# Patient Record
Sex: FEMALE | Race: BLACK OR AFRICAN AMERICAN | NOT HISPANIC OR LATINO | Employment: FULL TIME | URBAN - METROPOLITAN AREA
[De-identification: names, ages, dates, MRNs, and addresses within clinical notes are randomized per-mention and may not be internally consistent; named-entity substitution may affect disease eponyms.]

---

## 2017-09-06 ENCOUNTER — OFFICE VISIT (OUTPATIENT)
Dept: OBSTETRICS AND GYNECOLOGY | Facility: CLINIC | Age: 32
End: 2017-09-06

## 2017-09-06 VITALS
HEART RATE: 63 BPM | HEIGHT: 66 IN | WEIGHT: 213 LBS | SYSTOLIC BLOOD PRESSURE: 142 MMHG | BODY MASS INDEX: 34.23 KG/M2 | DIASTOLIC BLOOD PRESSURE: 83 MMHG

## 2017-09-06 DIAGNOSIS — Z01.419 ENCOUNTER FOR GYNECOLOGICAL EXAMINATION WITHOUT ABNORMAL FINDING: Primary | ICD-10-CM

## 2017-09-06 DIAGNOSIS — L73.2 VULVAL HIDRADENITIS SUPPURATIVA: ICD-10-CM

## 2017-09-06 PROCEDURE — 99395 PREV VISIT EST AGE 18-39: CPT | Performed by: OBSTETRICS & GYNECOLOGY

## 2017-09-06 NOTE — PROGRESS NOTES
"GYN Annual Exam     CC- Here for annual exam.     Diana Xie is a 32 y.o. female who presents for annual well woman exam. Periods are regular every 28-30 days, lasting 4 days. Dysmenorrhea:mild, occurring first 1-2 days of flow. Cyclic symptoms include none. No intermenstrual bleeding, spotting, or discharge.  Pt has an abscess on her left inner thigh.     OB History      Para Term  AB TAB SAB Ectopic Multiple Living    3 3        3          Current contraception: tubal ligation  History of abnormal Pap smear: yes - LEEP in the past  Family history of uterine, colon or ovarian cancer: yes - question about this in maternal grandmother -ovarian   History of abnormal mammogram: no  Family history of breast cancer: no  Last Pap : records are not available.     History reviewed. No pertinent past medical history.    Past Surgical History:   Procedure Laterality Date   • CERVICAL BIOPSY  W/ LOOP ELECTRODE EXCISION     •  SECTION     • TUBAL ABDOMINAL LIGATION     • WISDOM TOOTH EXTRACTION         No current outpatient prescriptions on file.    No Known Allergies    Social History   Substance Use Topics   • Smoking status: Former Smoker     Types: Cigarettes   • Smokeless tobacco: Never Used   • Alcohol use Yes       Family History   Problem Relation Age of Onset   • Ovarian cancer Maternal Grandmother    • Breast cancer Neg Hx    • Uterine cancer Neg Hx    • Colon cancer Neg Hx    • Deep vein thrombosis Neg Hx    • Pulmonary embolism Neg Hx        Review of Systems   Constitutional: Negative for chills and fever.   Gastrointestinal: Negative for abdominal pain.   Genitourinary: Negative for dysuria, menstrual problem, pelvic pain, vaginal bleeding and vaginal discharge.   All other systems reviewed and are negative.      /83  Pulse 63  Ht 66\" (167.6 cm)  Wt 213 lb (96.6 kg)  LMP 2017 (Exact Date)  Breastfeeding? No  BMI 34.38 kg/m2    Physical Exam   Constitutional: She is " oriented to person, place, and time. She appears well-developed and well-nourished. No distress.   HENT:   Head: Normocephalic and atraumatic.   Eyes: Conjunctivae are normal.   Neck: Normal range of motion. Neck supple. No thyromegaly present.   Cardiovascular: Normal rate and regular rhythm.    No murmur heard.  Pulmonary/Chest: Effort normal and breath sounds normal.   Abdominal: Soft. Bowel sounds are normal. She exhibits no distension. There is no tenderness.   Genitourinary: Vagina normal. Pelvic exam was performed with patient supine. There is no lesion on the right labia. There is lesion (2 cm abscess are on left side of mons toward inguinal fold ) on the left labia. Uterus is enlarged (8 weeks size- anteverted). Uterus is not fixed and not tender. Cervix exhibits no motion tenderness. Right adnexum displays no mass and no tenderness. Left adnexum displays no mass and no tenderness. No bleeding in the vagina. No vaginal discharge found.   Musculoskeletal: She exhibits no edema.   Lymphadenopathy:        Right: No inguinal adenopathy present.        Left: No inguinal adenopathy present.   Neurological: She is alert and oriented to person, place, and time.   Skin: No rash noted.   Psychiatric: She has a normal mood and affect. Her behavior is normal.          Assessment     1) GYN annual well woman exam.   2) hidradenitis suppurativa  Trial of Dicloxacillin         Plan     1) Breast Health - Clinical breast exam yearly, Self breast awareness monthly  2) Pap - updated today   3) Smoking status- non-smoker   4) Seat belts recommended  5) Follow up prn and one year.     Hernandez Monteiro MD   9/6/2017  1:21 PM

## 2017-09-07 ENCOUNTER — TELEPHONE (OUTPATIENT)
Dept: OBSTETRICS AND GYNECOLOGY | Facility: CLINIC | Age: 32
End: 2017-09-07

## 2017-09-07 RX ORDER — FLUCONAZOLE 150 MG/1
150 TABLET ORAL ONCE
Qty: 1 TABLET | Refills: 2 | Status: SHIPPED | OUTPATIENT
Start: 2017-09-07 | End: 2017-09-07

## 2017-09-07 NOTE — TELEPHONE ENCOUNTER
Gabrielle,     Medication sent to the pharmacy.     Thanks   Dr. Monteiro    ----- Message from Estefani Lacy sent at 9/7/2017  2:28 PM EDT -----  Contact: PT  Pt called to request rx for Diflucan.    Pt # [phone]

## 2017-09-09 LAB
CYTOLOGIST CVX/VAG CYTO: NORMAL
CYTOLOGY CVX/VAG DOC THIN PREP: NORMAL
DX ICD CODE: NORMAL
HIV 1 & 2 AB SER-IMP: NORMAL
HPV I/H RISK 1 DNA CVX QL PROBE+SIG AMP: NEGATIVE
Lab: NORMAL
OTHER STN SPEC: NORMAL
PATH REPORT.FINAL DX SPEC: NORMAL
STAT OF ADQ CVX/VAG CYTO-IMP: NORMAL

## 2017-10-12 ENCOUNTER — TELEPHONE (OUTPATIENT)
Dept: OBSTETRICS AND GYNECOLOGY | Facility: CLINIC | Age: 32
End: 2017-10-12

## 2017-10-12 RX ORDER — FLUCONAZOLE 150 MG/1
150 TABLET ORAL ONCE
Qty: 1 TABLET | Refills: 2 | Status: SHIPPED | OUTPATIENT
Start: 2017-10-12 | End: 2017-10-12

## 2017-10-12 RX ORDER — METRONIDAZOLE 500 MG/1
500 TABLET ORAL 2 TIMES DAILY
Qty: 14 TABLET | Refills: 4 | Status: SHIPPED | OUTPATIENT
Start: 2017-10-12 | End: 2017-10-19

## 2017-10-12 NOTE — TELEPHONE ENCOUNTER
Gabrielle    I sent in more medication. If she has trouble telling the difference, she needs to come in for a visit.     Thanks   Dr. Monteiro    ----- Message from Estefani Lacy sent at 10/12/2017  1:19 PM EDT -----  Contact: Pt  Pt called to report vaginal irritation, pressure and strong urge to urinate but cant always go.  Has had symptoms for 1 week.  Pt states was rx'd abx at annual on 9/6/17, and has also taken 2 doses of yeast medication.  Please advise.    Pt # [phone]

## 2017-10-24 ENCOUNTER — OFFICE VISIT (OUTPATIENT)
Dept: OBSTETRICS AND GYNECOLOGY | Facility: CLINIC | Age: 32
End: 2017-10-24

## 2017-10-24 VITALS
SYSTOLIC BLOOD PRESSURE: 113 MMHG | HEART RATE: 62 BPM | WEIGHT: 202 LBS | DIASTOLIC BLOOD PRESSURE: 71 MMHG | BODY MASS INDEX: 32.47 KG/M2 | HEIGHT: 66 IN

## 2017-10-24 DIAGNOSIS — N64.52 BLOODY DISCHARGE FROM RIGHT NIPPLE: ICD-10-CM

## 2017-10-24 DIAGNOSIS — N64.3 GALACTORRHEA: ICD-10-CM

## 2017-10-24 DIAGNOSIS — N64.4 BREAST PAIN, RIGHT: Primary | ICD-10-CM

## 2017-10-24 PROCEDURE — 99214 OFFICE O/P EST MOD 30 MIN: CPT | Performed by: OBSTETRICS & GYNECOLOGY

## 2017-10-24 NOTE — PROGRESS NOTES
"Subjective   Diana Xie is a 32 y.o. female c/o bloody discharge from the R breast. Pt noticed swelling on that breast last month 4 days prior to period.   LMP was 10/20/2017, started with d/c this am.     History of Present Illness     32 y.o. - One month ago with breast and areolar swelling   Right breast, right before cycle. Improved after cycle started. Next month with swelling and pain again, but now with bloody nipple discharge for about one week.  New to her. No family history of breast cancer. Pain is constant, dull ache.     The following portions of the patient's history were reviewed and updated as appropriate: allergies, current medications, past family history, past medical history, past social history, past surgical history and problem list.    Review of Systems   Constitutional: Negative for chills, fever and unexpected weight change.   Respiratory: Negative for chest tightness and shortness of breath.    Cardiovascular: Positive for chest pain.   Skin: Negative for color change, pallor, rash and wound.       Objective    /71  Pulse 62  Ht 66\" (167.6 cm)  Wt 202 lb (91.6 kg)  LMP 10/22/2017  BMI 32.6 kg/m2  Physical Exam   Constitutional: She appears well-developed and well-nourished. No distress.   Cardiovascular: Normal rate, regular rhythm and normal heart sounds.    No murmur heard.  Pulmonary/Chest: Effort normal and breath sounds normal. Right breast exhibits nipple discharge (Milky - possibly bloody discharge) and tenderness (Some tenderness in right areola). Right breast exhibits no inverted nipple, no mass and no skin change. Left breast exhibits no inverted nipple, no mass, no nipple discharge, no skin change and no tenderness.         Assessment/Plan   Problems Addressed this Visit     None      Visit Diagnoses     Breast pain, right    -  Primary    Relevant Orders    Mammo Diagnostic Bilateral With CAD    US breast right complete    Ambulatory Referral to Breast Surgery    " Galactorrhea        Relevant Orders    TSH Rfx On Abnormal To Free T4    Prolactin    Mammo Diagnostic Bilateral With CAD    US breast right complete    Ambulatory Referral to Breast Surgery    Bloody discharge from right nipple        Relevant Orders    Mammo Diagnostic Bilateral With CAD    US breast right complete    Ambulatory Referral to Breast Surgery        Not sure if this is significant or irritation.   Check TSH, PRL  Check MMG and ultrasound  Have surgeon consider if need ductogram or other testing of nipple discharge.     Discussed work up and follow up with patient.     Hernandez Monteiro MD  10/24/2017  3:22 PM

## 2017-10-25 ENCOUNTER — TELEPHONE (OUTPATIENT)
Dept: OBSTETRICS AND GYNECOLOGY | Facility: CLINIC | Age: 32
End: 2017-10-25

## 2017-10-25 LAB
PROLACTIN SERPL-MCNC: 4.1 NG/ML (ref 4.8–23.3)
TSH SERPL DL<=0.005 MIU/L-ACNC: 0.37 MIU/ML (ref 0.27–4.2)

## 2017-10-25 NOTE — TELEPHONE ENCOUNTER
----- Message from Hernandez Monteiro MD sent at 10/25/2017  9:41 AM EDT -----  Gabrielle, her labs are normal as far as the nipple discharge. Now waiting on mammogram, ultrasound and surgical consult. Thanks Dr. Monteiro

## 2017-11-06 ENCOUNTER — TELEPHONE (OUTPATIENT)
Dept: OBSTETRICS AND GYNECOLOGY | Facility: CLINIC | Age: 32
End: 2017-11-06

## 2017-11-06 NOTE — TELEPHONE ENCOUNTER
Amparo from Dr. Luis office called to inform us that the phone number in the PT chart has been disconnected and she was a no show for her appts at [facility] so they would not be able to see her if she keeps her appt for tomorrow with their office. If the PT calls back, please obtain a working phone number.

## 2017-11-07 ENCOUNTER — TELEPHONE (OUTPATIENT)
Dept: MAMMOGRAPHY | Facility: CLINIC | Age: 32
End: 2017-11-07

## 2017-11-07 NOTE — TELEPHONE ENCOUNTER
Tried to call patient to reschedule appointment since she missed her breast imaging appointment with [facility] yesterday. Phone number on file is invalid. Contacted Dr. Monteiro's office to let them know.     Amparo Jones RN

## 2017-11-07 NOTE — TELEPHONE ENCOUNTER
Called Dr. Monteiro's office to let them know the patient did not come to her appointment today. At this time we are going to close her file, but will reschedule if the patient contacts us.     Amparo Jones RN

## 2017-11-21 ENCOUNTER — TELEPHONE (OUTPATIENT)
Dept: OBSTETRICS AND GYNECOLOGY | Facility: CLINIC | Age: 32
End: 2017-11-21

## 2017-11-21 NOTE — TELEPHONE ENCOUNTER
----- Message from Hernandez Monteiro MD sent at 11/17/2017  2:52 PM EST -----  Sukumar    Mammogram and ultrasound  Is she going to set up and schedule?    Thanks   Dr. Monteiro      ----- Message -----     From: SYSTEM     Sent: 10/29/2017  12:08 AM       To: Hernandez Monteiro MD, #

## 2017-12-20 ENCOUNTER — TELEPHONE (OUTPATIENT)
Dept: OBSTETRICS AND GYNECOLOGY | Facility: CLINIC | Age: 32
End: 2017-12-20

## 2017-12-20 NOTE — TELEPHONE ENCOUNTER
No showed appointments for bloody nipple discharge. Please send letter to explain needs to keep re-schedule and follow up on testing.     Thanks   Dr. Monteiro

## 2018-01-16 ENCOUNTER — TELEPHONE (OUTPATIENT)
Dept: MAMMOGRAPHY | Facility: CLINIC | Age: 33
End: 2018-01-16

## 2018-01-16 ENCOUNTER — APPOINTMENT (OUTPATIENT)
Dept: ULTRASOUND IMAGING | Facility: HOSPITAL | Age: 33
End: 2018-01-16

## 2018-01-16 ENCOUNTER — HOSPITAL ENCOUNTER (EMERGENCY)
Facility: HOSPITAL | Age: 33
Discharge: HOME | End: 2018-01-16
Attending: EMERGENCY MEDICINE | Admitting: EMERGENCY MEDICINE

## 2018-01-16 ENCOUNTER — DOCUMENTATION (OUTPATIENT)
Dept: MAMMOGRAPHY | Facility: CLINIC | Age: 33
End: 2018-01-16

## 2018-01-16 VITALS
HEIGHT: 67 IN | RESPIRATION RATE: 16 BRPM | HEART RATE: 69 BPM | BODY MASS INDEX: 32.18 KG/M2 | DIASTOLIC BLOOD PRESSURE: 68 MMHG | WEIGHT: 205 LBS | SYSTOLIC BLOOD PRESSURE: 121 MMHG | TEMPERATURE: 97.9 F | OXYGEN SATURATION: 100 %

## 2018-01-16 DIAGNOSIS — N61.1 BREAST ABSCESS OF FEMALE: Primary | ICD-10-CM

## 2018-01-16 LAB
ANION GAP SERPL CALCULATED.3IONS-SCNC: 9.9 MMOL/L
BASOPHILS # BLD AUTO: 0.02 10*3/MM3 (ref 0–0.2)
BASOPHILS NFR BLD AUTO: 0.2 % (ref 0–1.5)
BUN BLD-MCNC: 7 MG/DL (ref 6–20)
BUN/CREAT SERPL: 10.6 (ref 7–25)
CALCIUM SPEC-SCNC: 8.6 MG/DL (ref 8.6–10.5)
CHLORIDE SERPL-SCNC: 101 MMOL/L (ref 98–107)
CO2 SERPL-SCNC: 29.1 MMOL/L (ref 22–29)
CREAT BLD-MCNC: 0.66 MG/DL (ref 0.57–1)
DEPRECATED RDW RBC AUTO: 51.1 FL (ref 37–54)
EOSINOPHIL # BLD AUTO: 0.14 10*3/MM3 (ref 0–0.7)
EOSINOPHIL NFR BLD AUTO: 1.2 % (ref 0.3–6.2)
ERYTHROCYTE [DISTWIDTH] IN BLOOD BY AUTOMATED COUNT: 15.4 % (ref 11.7–13)
GFR SERPL CREATININE-BSD FRML MDRD: 126 ML/MIN/1.73
GLUCOSE BLD-MCNC: 87 MG/DL (ref 65–99)
HCG SERPL QL: NEGATIVE
HCT VFR BLD AUTO: 39.1 % (ref 35.6–45.5)
HGB BLD-MCNC: 12.5 G/DL (ref 11.9–15.5)
IMM GRANULOCYTES # BLD: 0.02 10*3/MM3 (ref 0–0.03)
IMM GRANULOCYTES NFR BLD: 0.2 % (ref 0–0.5)
LYMPHOCYTES # BLD AUTO: 3.84 10*3/MM3 (ref 0.9–4.8)
LYMPHOCYTES NFR BLD AUTO: 32.8 % (ref 19.6–45.3)
MCH RBC QN AUTO: 28.9 PG (ref 26.9–32)
MCHC RBC AUTO-ENTMCNC: 32 G/DL (ref 32.4–36.3)
MCV RBC AUTO: 90.5 FL (ref 80.5–98.2)
MONOCYTES # BLD AUTO: 0.65 10*3/MM3 (ref 0.2–1.2)
MONOCYTES NFR BLD AUTO: 5.5 % (ref 5–12)
NEUTROPHILS # BLD AUTO: 7.05 10*3/MM3 (ref 1.9–8.1)
NEUTROPHILS NFR BLD AUTO: 60.1 % (ref 42.7–76)
PLATELET # BLD AUTO: 319 10*3/MM3 (ref 140–500)
PMV BLD AUTO: 10.3 FL (ref 6–12)
POTASSIUM BLD-SCNC: 3.5 MMOL/L (ref 3.5–5.2)
RBC # BLD AUTO: 4.32 10*6/MM3 (ref 3.9–5.2)
SODIUM BLD-SCNC: 140 MMOL/L (ref 136–145)
WBC NRBC COR # BLD: 11.72 10*3/MM3 (ref 4.5–10.7)

## 2018-01-16 PROCEDURE — 99244 OFF/OP CNSLTJ NEW/EST MOD 40: CPT | Performed by: SURGERY

## 2018-01-16 PROCEDURE — 76642 ULTRASOUND BREAST LIMITED: CPT

## 2018-01-16 PROCEDURE — 80048 BASIC METABOLIC PNL TOTAL CA: CPT | Performed by: EMERGENCY MEDICINE

## 2018-01-16 PROCEDURE — 99283 EMERGENCY DEPT VISIT LOW MDM: CPT

## 2018-01-16 PROCEDURE — 85025 COMPLETE CBC W/AUTO DIFF WBC: CPT | Performed by: EMERGENCY MEDICINE

## 2018-01-16 PROCEDURE — 84703 CHORIONIC GONADOTROPIN ASSAY: CPT | Performed by: EMERGENCY MEDICINE

## 2018-01-16 RX ORDER — DOXYCYCLINE 100 MG/1
100 CAPSULE ORAL EVERY 12 HOURS SCHEDULED
Qty: 20 CAPSULE | Refills: 0 | Status: SHIPPED | OUTPATIENT
Start: 2018-01-16 | End: 2018-09-05

## 2018-01-16 NOTE — ED TRIAGE NOTES
"Pt reports pain on right breast for 2 months.  Pt states \"I had some discharge from my nipple about 2 months ago.\" \" The spot on my right breast is about the size of a doughnut hole.\" \"I went to my Gyno who told me to follow up with a specialist but my insurance will not cover the specialist with out a referral.\" \"I now have a spot underneath my right armpit.\" \"The last two days the pain in my right breast is increasing.\" PT denies fevers, chills  "

## 2018-01-16 NOTE — TELEPHONE ENCOUNTER
Received message that pt needed to make an appt. Pt originally referred to office back in 10/2017 and pt no showed scheduled appt. Pts number was confirmed with Karlee and was given to me to call pt back to schedule.   I tried calling pt to schedule however both home and mother's number are non working.   If pt calls back. Pt has passport insurance and she needs to be advised that a referral from her PCP needs to be obtained before an appt can be made

## 2018-01-16 NOTE — CONSULTS
Date of consultation  1-    History of present illness-the patient is a 33-year-old black female who states that 2 months ago around the beginning of her menstrual cycle she developed some right breast discomfort.  After her menstrual cycle the pain subsided but the next month she again developed pain and she noted some milky discharge from the nipple as well as some blood.  This discharge was spontaneous and just came from the right breast.  There was no associated trauma.  She also felt a lump beneath the areola at this time and she went to see her OB/GYN.  A prolactin level was drawn and it was normal.  Mammograms and ultrasound were ordered but due to some problems getting into her primary care doctor, these studies have not been performed.  The pain became worse and she felt a lump under the arm prompted her to come to the emergency room today.  An ultrasound was performed and it shows changes consistent with an abscess.    Past history  Medical illnesses-the patient denies any diabetes mellitus hypertension heart lung or kidney disease.  She did have gestational diabetes during her last pregnancy  Prior [id number] ×3  Meds on admission-none  Allergies-none    Review of systems-the review of systems is negative except for some pain and a lump in the right axilla.  The patient has also lost 100 pounds in the last 2 years after she has given up sugar.    Social history-the patient smokes 2-3 cigarettes per day and has an occasional drink area    Physical examination  Vital signs-blood pressure 167/83, pulse 89, which are 97.9  Gen.-alert, oriented, black female in no acute distress  HEENT-normocephalic, sclera are nonicteric, extraocular movements are intact  Heart-regular rate and rhythm without murmur  Lungs-clear to auscultation  Right breast-there is some redness around the edge of the areola which is fairly faint.  It covers an area from 3:00 to 7:00.  There is a mass beneath the areola in  the 5 o'clock position that is quite tender and rather firm.  The rest of the breast is free of any palpable abnormalities in the nipple areolar complex is not distorted in anyway.  Left breast-there is no skin dimpling or nipple retraction, I do not palpate any masses in the breast  Lymphatics-there is no obvious axillary adenopathy.  The area that she was feeling is more involving the skin of the axilla above the hairline.  Abdomen-soft and nontender without masses or hepatosplenomegaly.  She does have.  Extremities-full range of motion of all extremities    Impression-right breast abscess.  The etiology of this is not clear.  The patient is not breast-feeding and his had no trauma to the breast.  This is a fairly chronic problem and I think it would be best treated by going to the operating room for formal incision and drainage.    Plan-the patient has eaten and therefore we will see about scheduling this tomorrow.  I have discussed with the patient the details of the procedure and how we might consider leaving the wound open to be packed or put a small drain in it.

## 2018-01-16 NOTE — ED PROVIDER NOTES
" EMERGENCY DEPARTMENT ENCOUNTER    CHIEF COMPLAINT  Chief Complaint: breast pain  History given by: patient  History limited by: nothing  Room Number: 47/47  PMD: Kavya Mcnally      HPI:  Pt is a 33 y.o. female who presents complaining of intermittent right breast tenderness for the last two months. Pt states that she had one episode of yellow, bloody nipple discharge at the beginning of her symptoms but that she has not had any discharge since that time. Pt states that her right breast has become large and increasingly painful over the last two days. Pt states that she noticed a \"lump\" in her R axilla as well. Pt states that she was scheduled for a mammogram and to see a breast surgeon but was unable to do so due to insurance issues.    Duration:  2 months  Onset: gradual  Timing: intermitten  Location: R breast  Radiation: none  Quality: tenderness  Intensity/Severity: moderate  Progression: worsening  Associated Symptoms: nipple discharge, \"lump\" in R axilla  Aggravating Factors: none  Alleviating Factors: none  Previous Episodes: none mentioned   Treatment before arrival: none    PAST MEDICAL HISTORY  Active Ambulatory Problems     Diagnosis Date Noted   • No Active Ambulatory Problems     Resolved Ambulatory Problems     Diagnosis Date Noted   • No Resolved Ambulatory Problems     No Additional Past Medical History       PAST SURGICAL HISTORY  Past Surgical History:   Procedure Laterality Date   • CERVICAL BIOPSY  W/ LOOP ELECTRODE EXCISION     •  SECTION     • TUBAL ABDOMINAL LIGATION     • WISDOM TOOTH EXTRACTION         FAMILY HISTORY  Family History   Problem Relation Age of Onset   • Ovarian cancer Maternal Grandmother    • Breast cancer Neg Hx    • Uterine cancer Neg Hx    • Colon cancer Neg Hx    • Deep vein thrombosis Neg Hx    • Pulmonary embolism Neg Hx        SOCIAL HISTORY  Social History     Social History   • Marital status: Single     Spouse name: N/A   • Number of children: N/A   • Years " of education: N/A     Occupational History   • Not on file.     Social History Main Topics   • Smoking status: Former Smoker     Types: Cigarettes   • Smokeless tobacco: Never Used   • Alcohol use Yes   • Drug use: No   • Sexual activity: Yes     Partners: Male     Birth control/ protection: Surgical     Other Topics Concern   • Not on file     Social History Narrative       ALLERGIES  Review of patient's allergies indicates no known allergies.    REVIEW OF SYSTEMS  Review of Systems   Constitutional: Negative for chills and fever.   HENT: Negative for sore throat.    Respiratory: Negative for cough.    Gastrointestinal: Negative for nausea and vomiting.   Genitourinary: Negative for dysuria.   Musculoskeletal: Negative for back pain.        Right breast tenderness, nipple discharage   Hematological: Positive for adenopathy (R axilla).   Psychiatric/Behavioral: The patient is not nervous/anxious.        PHYSICAL EXAM  ED Triage Vitals   Temp Heart Rate Resp BP SpO2   01/16/18 1543 01/16/18 1542 01/16/18 1542 01/16/18 1542 01/16/18 1542   97.9 °F (36.6 °C) 89 16 167/83 100 %      Temp src Heart Rate Source Patient Position BP Location FiO2 (%)   01/16/18 1543 01/16/18 1542 01/16/18 1542 01/16/18 1542 --   Oral Monitor Sitting Right arm        Physical Exam   Constitutional: She is oriented to person, place, and time and well-developed, well-nourished, and in no distress.   Eyes: EOM are normal.   Neck: Normal range of motion.   Cardiovascular: Normal rate and regular rhythm.    Pulmonary/Chest: Effort normal and breath sounds normal. No respiratory distress. Right breast exhibits tenderness. Right breast exhibits no nipple discharge.   Female chaperone present for breast exam. 3x3cm tender, indurated area medial to the right nipple   Lymphadenopathy:     She has axillary adenopathy (right).   Neurological: She is alert and oriented to person, place, and time. She has normal sensation and normal strength.   Skin: Skin  is warm and dry.   Psychiatric: Affect normal.   Nursing note and vitals reviewed.    RADIOLOGY  US Breast Right Limited   Final Result         Reviewed pt's US R Breast, which shows a right retroareolar fluid collection with irregular margins, through enhancement and internal irregular echogenicity, which in this clinical setting is virtually diagnostic of abscess measuring up to 1.7 x 1.3 x 1.7 cm. Independently viewed by me. Interpreted by radiologist. Discussed with US technician.    I ordered the above noted radiological studies. Interpreted by radiologist. Reviewed by me in PACS.       PROCEDURES  Procedures      PROGRESS AND CONSULTS  ED Course     1637- Ordered US of right breast for further evaluation and placed call to Dr. Luis (breast surgery) for consult.    1652- Discussed the pt's case with Dr. Luis (breast surgery) who states that he will come evaluate the pt in the ED.    1658- Rechecked pt. Pt is on her way to US. Notified pt of the consult with Dr. Luis (breast surgery) and that he will come evaluate the pt in the ED.    1716- Dr. Luis (breast surgery) is in the ED for consult.    1744- Discussed the pt's case with Dr. Luis (breast surgery) who requests that I start the pt on doxycycline 100mg BID and that he will see the pt tomorrow to remove the pt's abscess. He requested that I order pre-operative blood work that he will follow up on tomorrow.    1746- Ordered blood work for further evaluation.    1752- Rechecked pt. Pt is resting comfortably. Notified pt of the consult with Dr. Luis and the plan to discharge the pt home on abx with instructions to follow up with Dr. Luis. Pt agrees with the plan and all questions.    MEDICAL DECISION MAKING  Results were reviewed/discussed with the patient and they were also made aware of online access. Pt also made aware that some labs, such as cultures, will not be resulted during ER visit and follow up with PMD is necessary.      MDM  Number of Diagnoses or Management Options  Breast abscess of female:      Amount and/or Complexity of Data Reviewed  Clinical lab tests: ordered (Dr. Luis will follow up on labs drawn today)  Tests in the radiology section of CPT®: ordered and reviewed (R Breast US shows a right retroareolar fluid collection with irregular margins, through enhancement and internal irregular echogenicity, which in this clinical setting is virtually diagnostic of abscess measuring up to 1.7 x 1.3 x 1.7 cm.)  Discussion of test results with the performing providers: yes (US technician)  Discuss the patient with other providers: yes (D/w Dr. Luis (breast surgery))  Independent visualization of images, tracings, or specimens: yes    Patient Progress  Patient progress: stable         DIAGNOSIS  Final diagnoses:   Breast abscess of female       DISPOSITION  DISCHARGE    Patient discharged in stable condition.    Reviewed implications of results, diagnosis, meds, responsibility to follow up, warning signs and symptoms of possible worsening, potential complications and reasons to return to ER, including fever, worsening pain or any concerns.    Patient/Family voiced understanding of above instructions.    Discussed plan for discharge, as there is no emergent indication for admission.  Pt/family is agreeable and understands need for follow up and repeat testing.  Pt is aware that discharge does not mean that nothing is wrong but it indicates no emergency is present that requires admission and they must continue care with follow-up as given below or physician of their choice.     FOLLOW-UP  Dylan Luis MD  [address] [place] [address] [id number] [phone]    Schedule an appointment as soon as possible for a visit           Medication List      New Prescriptions          doxycycline 100 MG capsule   Commonly known as:  MONODOX   Take 1 capsule by mouth Every 12 (Twelve) Hours.               Latest  Documented Vital Signs:  As of 5:54 PM  BP- 167/83 HR- 89 Temp- 97.9 °F (36.6 °C) (Oral) O2 sat- 100%    --  Documentation assistance provided by bebo Navarro for Dr. Dior.  Information recorded by the scribe was done at my direction and has been verified and validated by me.     Ne Navarro  01/16/18 [id number]       Jace Dior MD  01/16/18 [id number]

## 2018-01-16 NOTE — CONSULTS
Date of consultation   1-16/2018    History of present illness-the patient is a 33-year-old black female who states that she has been having problems in her right breast for about 2 months.  The problem first began right round her menstrual cycle several months ago.  She detected some pain in the breast beneath the areola.  This pain subsided after she completed her menstrual cycle.  The next month she again developed the pain and this time it was associated with some milky discharge and then some blood was noted.  She can also feel more of a mass at that time that has not gone away.  She saw her OB/GYN late December for these problems.  A prolactin level was drawn and it was normal.  She also had mammograms and an ultrasound ordered but due to some issues getting into her primary care doctor those had not been completed.  The pain became rather bad today and she also noticed a lump under her arm which concerned her.  These things prompted her to come to the emergency room today.  An ultrasound was obtained today and it reveals changes most consistent with an abscess.  Past history    Medical illnesses-the patient denies any hypertension, heart lung or kidney disease.  She did have some gestational diabetes.    Surgeries-the patient has had 3 C-sections  Meds-none  Allergies-none    Review of systems  Aside from a 100 pound weight loss over the last several years because of dietary changes her review of systems is negative.    Social history-the patient smokes 2-3 cigarettes per day and just has an occasional alcoholic drink    Physical examination  Vital signs-temperature 97.9, blood pressure 167/83, pulse 89  HEENT-normocephalic, extraocular movements are intact  Heart-regular rate and rhythm without murmur  Lungs-clear to auscultation  Right breast-there is some faint redness at the edge of the areola from 3:00 to 7:00.  The nipple areolar complex is not distorted in anyway.  There is a mass palpable beneath the  areola in the 5 o'clock position which is quite tender to the patient.  Left breast-there is no evidence of skin dimpling or nipple retraction.  I do not palpate any concerning masses in the breast.  Lymphatics-I do not palpate any concerning lymph nodes in the axilla.  The area that she was feeling is actually up on the undersurface of the arm and measures about 1.5 x 1 cm.  It is tender to touch but the overlying skin is not red and the area is not fluctuant.  Abdomen-soft and nontender without masses or hepatosplenomegaly.  She does have  scars    Impression-chronic right breast abscess.  The etiology is not entirely clear.  The patient is not breast-feeding and denies any trauma to the breast.    Plan-the patient has eaten today and this does not appear to be an emergency.  I will schedule her to have incision and drainage of this abscess in the operating room.

## 2018-01-17 ENCOUNTER — PREP FOR SURGERY (OUTPATIENT)
Dept: OTHER | Facility: HOSPITAL | Age: 33
End: 2018-01-17

## 2018-01-17 ENCOUNTER — HOSPITAL ENCOUNTER (OUTPATIENT)
Facility: HOSPITAL | Age: 33
Setting detail: HOSPITAL OUTPATIENT SURGERY
Discharge: HOME | End: 2018-01-17
Attending: SURGERY | Admitting: SURGERY

## 2018-01-17 ENCOUNTER — ANESTHESIA EVENT (OUTPATIENT)
Dept: PERIOP | Facility: HOSPITAL | Age: 33
End: 2018-01-17

## 2018-01-17 ENCOUNTER — ANESTHESIA (OUTPATIENT)
Dept: PERIOP | Facility: HOSPITAL | Age: 33
End: 2018-01-17

## 2018-01-17 ENCOUNTER — TELEPHONE (OUTPATIENT)
Dept: MAMMOGRAPHY | Facility: CLINIC | Age: 33
End: 2018-01-17

## 2018-01-17 VITALS
SYSTOLIC BLOOD PRESSURE: 119 MMHG | BODY MASS INDEX: 31.93 KG/M2 | WEIGHT: 203.44 LBS | HEART RATE: 68 BPM | OXYGEN SATURATION: 99 % | DIASTOLIC BLOOD PRESSURE: 78 MMHG | HEIGHT: 67 IN | RESPIRATION RATE: 16 BRPM | TEMPERATURE: 98 F

## 2018-01-17 DIAGNOSIS — N61.1 ABSCESS OF RIGHT BREAST: ICD-10-CM

## 2018-01-17 DIAGNOSIS — N61.1 BREAST ABSCESS: ICD-10-CM

## 2018-01-17 DIAGNOSIS — N61.1 BREAST ABSCESS: Primary | ICD-10-CM

## 2018-01-17 LAB
B-HCG UR QL: NEGATIVE
INTERNAL NEGATIVE CONTROL: NEGATIVE
INTERNAL POSITIVE CONTROL: POSITIVE
Lab: NORMAL

## 2018-01-17 PROCEDURE — 87205 SMEAR GRAM STAIN: CPT | Performed by: SURGERY

## 2018-01-17 PROCEDURE — 25010000002 FENTANYL CITRATE (PF) 100 MCG/2ML SOLUTION: Performed by: ANESTHESIOLOGY

## 2018-01-17 PROCEDURE — 87070 CULTURE OTHR SPECIMN AEROBIC: CPT | Performed by: SURGERY

## 2018-01-17 PROCEDURE — 25010000002 MIDAZOLAM PER 1 MG: Performed by: ANESTHESIOLOGY

## 2018-01-17 PROCEDURE — 19020 MASTOTOMY EXPL DRG ABSC DP: CPT | Performed by: SURGERY

## 2018-01-17 PROCEDURE — 25010000002 ONDANSETRON PER 1 MG: Performed by: ANESTHESIOLOGY

## 2018-01-17 PROCEDURE — 25010000002 PROPOFOL 10 MG/ML EMULSION: Performed by: ANESTHESIOLOGY

## 2018-01-17 PROCEDURE — 87075 CULTR BACTERIA EXCEPT BLOOD: CPT | Performed by: SURGERY

## 2018-01-17 PROCEDURE — 88305 TISSUE EXAM BY PATHOLOGIST: CPT | Performed by: SURGERY

## 2018-01-17 RX ORDER — EPHEDRINE SULFATE 50 MG/ML
5 INJECTION, SOLUTION INTRAVENOUS ONCE AS NEEDED
Status: DISCONTINUED | OUTPATIENT
Start: 2018-01-17 | End: 2018-01-17 | Stop reason: HOSPADM

## 2018-01-17 RX ORDER — SODIUM CHLORIDE, SODIUM LACTATE, POTASSIUM CHLORIDE, CALCIUM CHLORIDE 600; 310; 30; 20 MG/100ML; MG/100ML; MG/100ML; MG/100ML
9 INJECTION, SOLUTION INTRAVENOUS CONTINUOUS
Status: DISCONTINUED | OUTPATIENT
Start: 2018-01-17 | End: 2018-01-17 | Stop reason: HOSPADM

## 2018-01-17 RX ORDER — MAGNESIUM HYDROXIDE 1200 MG/15ML
LIQUID ORAL AS NEEDED
Status: DISCONTINUED | OUTPATIENT
Start: 2018-01-17 | End: 2018-01-17 | Stop reason: HOSPADM

## 2018-01-17 RX ORDER — HYDROCODONE BITARTRATE AND ACETAMINOPHEN 5; 325 MG/1; MG/1
1-2 TABLET ORAL EVERY 4 HOURS PRN
Qty: 20 TABLET | Refills: 0 | Status: SHIPPED | OUTPATIENT
Start: 2018-01-17 | End: 2018-09-05

## 2018-01-17 RX ORDER — BUPIVACAINE HYDROCHLORIDE 2.5 MG/ML
INJECTION, SOLUTION EPIDURAL; INFILTRATION; INTRACAUDAL AS NEEDED
Status: DISCONTINUED | OUTPATIENT
Start: 2018-01-17 | End: 2018-01-17 | Stop reason: HOSPADM

## 2018-01-17 RX ORDER — NALOXONE HCL 0.4 MG/ML
0.4 VIAL (ML) INJECTION AS NEEDED
Status: DISCONTINUED | OUTPATIENT
Start: 2018-01-17 | End: 2018-01-17 | Stop reason: HOSPADM

## 2018-01-17 RX ORDER — LIDOCAINE HYDROCHLORIDE 10 MG/ML
0.5 INJECTION, SOLUTION EPIDURAL; INFILTRATION; INTRACAUDAL; PERINEURAL ONCE AS NEEDED
Status: DISCONTINUED | OUTPATIENT
Start: 2018-01-17 | End: 2018-01-17 | Stop reason: HOSPADM

## 2018-01-17 RX ORDER — PROPOFOL 10 MG/ML
VIAL (ML) INTRAVENOUS AS NEEDED
Status: DISCONTINUED | OUTPATIENT
Start: 2018-01-17 | End: 2018-01-17 | Stop reason: SURG

## 2018-01-17 RX ORDER — MORPHINE SULFATE 2 MG/ML
2 INJECTION, SOLUTION INTRAMUSCULAR; INTRAVENOUS
Status: DISCONTINUED | OUTPATIENT
Start: 2018-01-17 | End: 2018-01-17 | Stop reason: HOSPADM

## 2018-01-17 RX ORDER — MIDAZOLAM HYDROCHLORIDE 1 MG/ML
1 INJECTION INTRAMUSCULAR; INTRAVENOUS
Status: DISCONTINUED | OUTPATIENT
Start: 2018-01-17 | End: 2018-01-17 | Stop reason: HOSPADM

## 2018-01-17 RX ORDER — MIDAZOLAM HYDROCHLORIDE 1 MG/ML
2 INJECTION INTRAMUSCULAR; INTRAVENOUS
Status: DISCONTINUED | OUTPATIENT
Start: 2018-01-17 | End: 2018-01-17 | Stop reason: HOSPADM

## 2018-01-17 RX ORDER — ONDANSETRON 2 MG/ML
INJECTION INTRAMUSCULAR; INTRAVENOUS AS NEEDED
Status: DISCONTINUED | OUTPATIENT
Start: 2018-01-17 | End: 2018-01-17 | Stop reason: SURG

## 2018-01-17 RX ORDER — SODIUM CHLORIDE, SODIUM LACTATE, POTASSIUM CHLORIDE, CALCIUM CHLORIDE 600; 310; 30; 20 MG/100ML; MG/100ML; MG/100ML; MG/100ML
100 INJECTION, SOLUTION INTRAVENOUS CONTINUOUS
Status: DISCONTINUED | OUTPATIENT
Start: 2018-01-17 | End: 2018-01-17 | Stop reason: HOSPADM

## 2018-01-17 RX ORDER — FAMOTIDINE 10 MG/ML
20 INJECTION, SOLUTION INTRAVENOUS ONCE
Status: COMPLETED | OUTPATIENT
Start: 2018-01-17 | End: 2018-01-17

## 2018-01-17 RX ORDER — HYDROCODONE BITARTRATE AND ACETAMINOPHEN 5; 325 MG/1; MG/1
1 TABLET ORAL ONCE AS NEEDED
Status: DISCONTINUED | OUTPATIENT
Start: 2018-01-17 | End: 2018-01-17 | Stop reason: HOSPADM

## 2018-01-17 RX ORDER — ONDANSETRON 2 MG/ML
4 INJECTION INTRAMUSCULAR; INTRAVENOUS ONCE AS NEEDED
Status: COMPLETED | OUTPATIENT
Start: 2018-01-17 | End: 2018-01-17

## 2018-01-17 RX ORDER — FENTANYL CITRATE 50 UG/ML
25 INJECTION, SOLUTION INTRAMUSCULAR; INTRAVENOUS
Status: DISCONTINUED | OUTPATIENT
Start: 2018-01-17 | End: 2018-01-17 | Stop reason: HOSPADM

## 2018-01-17 RX ORDER — LABETALOL HYDROCHLORIDE 5 MG/ML
5 INJECTION, SOLUTION INTRAVENOUS
Status: DISCONTINUED | OUTPATIENT
Start: 2018-01-17 | End: 2018-01-17 | Stop reason: HOSPADM

## 2018-01-17 RX ORDER — IBUPROFEN 800 MG/1
800 TABLET ORAL EVERY 6 HOURS PRN
COMMUNITY
End: 2018-09-05

## 2018-01-17 RX ORDER — FENTANYL CITRATE 50 UG/ML
50 INJECTION, SOLUTION INTRAMUSCULAR; INTRAVENOUS
Status: DISCONTINUED | OUTPATIENT
Start: 2018-01-17 | End: 2018-01-17 | Stop reason: HOSPADM

## 2018-01-17 RX ORDER — SODIUM CHLORIDE 0.9 % (FLUSH) 0.9 %
1-10 SYRINGE (ML) INJECTION AS NEEDED
Status: DISCONTINUED | OUTPATIENT
Start: 2018-01-17 | End: 2018-01-17 | Stop reason: HOSPADM

## 2018-01-17 RX ADMIN — FENTANYL CITRATE 100 MCG: 50 INJECTION INTRAMUSCULAR; INTRAVENOUS at 16:00

## 2018-01-17 RX ADMIN — FENTANYL CITRATE 25 MCG: 50 INJECTION, SOLUTION INTRAMUSCULAR; INTRAVENOUS at 17:34

## 2018-01-17 RX ADMIN — SODIUM CHLORIDE, POTASSIUM CHLORIDE, SODIUM LACTATE AND CALCIUM CHLORIDE 9 ML/HR: 600; 310; 30; 20 INJECTION, SOLUTION INTRAVENOUS at 14:56

## 2018-01-17 RX ADMIN — PROPOFOL 400 MG: 10 INJECTION, EMULSION INTRAVENOUS at 16:15

## 2018-01-17 RX ADMIN — Medication 1 MG: at 14:57

## 2018-01-17 RX ADMIN — ONDANSETRON 4 MG: 2 INJECTION INTRAMUSCULAR; INTRAVENOUS at 17:47

## 2018-01-17 RX ADMIN — HYDROCODONE BITARTRATE AND ACETAMINOPHEN 1 TABLET: 5; 325 TABLET ORAL at 18:03

## 2018-01-17 RX ADMIN — FAMOTIDINE 20 MG: 10 INJECTION, SOLUTION INTRAVENOUS at 14:57

## 2018-01-17 RX ADMIN — SODIUM CHLORIDE, POTASSIUM CHLORIDE, SODIUM LACTATE AND CALCIUM CHLORIDE 9 ML/HR: 600; 310; 30; 20 INJECTION, SOLUTION INTRAVENOUS at 17:50

## 2018-01-17 RX ADMIN — SODIUM CHLORIDE, POTASSIUM CHLORIDE, SODIUM LACTATE AND CALCIUM CHLORIDE: 600; 310; 30; 20 INJECTION, SOLUTION INTRAVENOUS at 16:15

## 2018-01-17 RX ADMIN — FENTANYL CITRATE 25 MCG: 50 INJECTION, SOLUTION INTRAMUSCULAR; INTRAVENOUS at 17:16

## 2018-01-17 RX ADMIN — ONDANSETRON 4 MG: 2 INJECTION INTRAMUSCULAR; INTRAVENOUS at 16:30

## 2018-01-17 NOTE — OP NOTE
Operative note    Preoperative Diagnosis: Breast abscess, right    Postoperative Diagnosis: Same    Procedure Performed:right breast  incision and drainage of abscess with biopsy of abscess  wall    Dictating physician and surgeon: Dylan Luis MD      EBL: Minimal    FINDINGS AND DESCRIPTION OF PROCEDURE:     The patient was taken to the operating room and given adequate general anesthesia.  The right breast was prepped and draped in sterile fashion.  We made a circumareolar incision in the lower inner aspect of the areola.  Dissection was carried behind the areola until we encountered the abscess cavity.  The purulent material was cultured and then suctioned out.  Digital manipulation was used to break down any loculations that were present.  We did biopsy a portion of the wall and sent it to pathology.          The wound was irrigated and hemostasis obtained using electrocautery unit.  A quarter-inch Penrose drain was placed in the abscess cavity and brought out through the corner of our incision.  We then secured it at the skin level with 4-0 nylon sutures.  We also loosely closed the remainder of the incision with 4-0 nylon sutures.  A dry dressing was applied and the patient was awakened and taken to the recovery area in stable condition.  Sponge and needle counts were correct and there were no complications.    Sponge and needle counts were correct and the patient was taken to the recovery area in stable condition.

## 2018-01-17 NOTE — ANESTHESIA PROCEDURE NOTES
Airway  Urgency: elective    Airway not difficult    General Information and Staff    Patient location during procedure: OR  Anesthesiologist: LAKHWINDER MAGDALENO    Indications and Patient Condition    Preoxygenated: yes  Mask difficulty assessment: 1 - vent by mask    Final Airway Details  Final airway type: supraglottic airway      Successful airway: classic  Size 4    Number of attempts at approach: 2    Additional Comments  Bit down occluding LMA, desat recovered quickly, after anectine

## 2018-01-17 NOTE — PLAN OF CARE
Problem: Patient Care Overview (Adult)  Goal: Plan of Care Review  Outcome: Ongoing (interventions implemented as appropriate)   01/17/18 1447   Coping/Psychosocial Response Interventions   Plan Of Care Reviewed With patient     Goal: Adult Individualization and Mutuality  Outcome: Ongoing (interventions implemented as appropriate)   01/17/18 1447   Individualization   Patient Specific Preferences PT GOES BY WANDA     Goal: Discharge Needs Assessment  Outcome: Ongoing (interventions implemented as appropriate)      Problem: Perioperative Period (Adult)  Goal: Signs and Symptoms of Listed Potential Problems Will be Absent or Manageable (Perioperative Period)  Outcome: Ongoing (interventions implemented as appropriate)   01/17/18 1447   Perioperative Period   Problems Assessed (Perioperative Period) pain   Problems Present (Perioperative Period) pain

## 2018-01-17 NOTE — H&P (VIEW-ONLY)
Date of consultation  1-    History of present illness-the patient is a 33-year-old black female who states that 2 months ago around the beginning of her menstrual cycle she developed some right breast discomfort.  After her menstrual cycle the pain subsided but the next month she again developed pain and she noted some milky discharge from the nipple as well as some blood.  This discharge was spontaneous and just came from the right breast.  There was no associated trauma.  She also felt a lump beneath the areola at this time and she went to see her OB/GYN.  A prolactin level was drawn and it was normal.  Mammograms and ultrasound were ordered but due to some problems getting into her primary care doctor, these studies have not been performed.  The pain became worse and she felt a lump under the arm prompted her to come to the emergency room today.  An ultrasound was performed and it shows changes consistent with an abscess.    Past history  Medical illnesses-the patient denies any diabetes mellitus hypertension heart lung or kidney disease.  She did have gestational diabetes during her last pregnancy  Prior [id number] ×3  Meds on admission-none  Allergies-none    Review of systems-the review of systems is negative except for some pain and a lump in the right axilla.  The patient has also lost 100 pounds in the last 2 years after she has given up sugar.    Social history-the patient smokes 2-3 cigarettes per day and has an occasional drink area    Physical examination  Vital signs-blood pressure 167/83, pulse 89, which are 97.9  Gen.-alert, oriented, black female in no acute distress  HEENT-normocephalic, sclera are nonicteric, extraocular movements are intact  Heart-regular rate and rhythm without murmur  Lungs-clear to auscultation  Right breast-there is some redness around the edge of the areola which is fairly faint.  It covers an area from 3:00 to 7:00.  There is a mass beneath the areola in  the 5 o'clock position that is quite tender and rather firm.  The rest of the breast is free of any palpable abnormalities in the nipple areolar complex is not distorted in anyway.  Left breast-there is no skin dimpling or nipple retraction, I do not palpate any masses in the breast  Lymphatics-there is no obvious axillary adenopathy.  The area that she was feeling is more involving the skin of the axilla above the hairline.  Abdomen-soft and nontender without masses or hepatosplenomegaly.  She does have.  Extremities-full range of motion of all extremities    Impression-right breast abscess.  The etiology of this is not clear.  The patient is not breast-feeding and his had no trauma to the breast.  This is a fairly chronic problem and I think it would be best treated by going to the operating room for formal incision and drainage.    Plan-the patient has eaten and therefore we will see about scheduling this tomorrow.  I have discussed with the patient the details of the procedure and how we might consider leaving the wound open to be packed or put a small drain in it.

## 2018-01-17 NOTE — ANESTHESIA POSTPROCEDURE EVALUATION
"Patient: Diana Xie    Procedure Summary     Date Anesthesia Start Anesthesia Stop Room / Location    01/17/18 1602 1701  [facility] / [facility]       Procedure Diagnosis Surgeon Provider    RT. BREAST ABSCESS INCISION AND DRAINAGE (Right Breast) No diagnosis on file. MD Ying De Leon MD          Anesthesia Type: general  Last vitals  BP   118/74 (01/17/18 1814)   Temp   36.7 °C (98 °F) (01/17/18 1800)   Pulse   72 (01/17/18 1814)   Resp   16 (01/17/18 1814)     SpO2   99 % (01/17/18 1814)     Post Anesthesia Care and Evaluation    Patient location during evaluation: PACU  Patient participation: complete - patient participated  Level of consciousness: awake and alert  Pain management: adequate  Airway patency: patent  Anesthetic complications: No anesthetic complications    Cardiovascular status: acceptable  Respiratory status: acceptable  Hydration status: acceptable    Comments: /74 (BP Location: Right arm, Patient Position: Sitting)  Pulse 72  Temp 36.7 °C (98 °F) (Oral)   Resp 16  Ht 170.2 cm (67.01\")  Wt 92.3 kg (203 lb 7 oz)  [facility] 01/05/2018 Comment: NEGATIVE URINE HCG  SpO2 99%  BMI 31.86 kg/m2      "

## 2018-01-17 NOTE — PLAN OF CARE
Problem: Patient Care Overview (Adult)  Goal: Plan of Care Review  Outcome: Outcome(s) achieved Date Met: 01/17/18    Goal: Adult Individualization and Mutuality  Outcome: Outcome(s) achieved Date Met: 01/17/18    Goal: Discharge Needs Assessment  Outcome: Outcome(s) achieved Date Met: 01/17/18      Problem: Perioperative Period (Adult)  Goal: Signs and Symptoms of Listed Potential Problems Will be Absent or Manageable (Perioperative Period)  Outcome: Outcome(s) achieved Date Met: 01/17/18

## 2018-01-17 NOTE — TELEPHONE ENCOUNTER
Patient was seen in the ER 1/16/18 by Dr. Luis for a breast abscess. Dr. Luis was able to place patient on the OR schedule for 1/17/18 for an I&D of her breast, however when patient was called the numbers listed stated not working and the current employer number stated patient did not work there.    Patient did call 30 minutes later to see if we were doing surgery, I did confirm all phone numbers with the patient however she did update her work number, which is not entered. She confirmed that her phone number is correct as well as the number listed for her mother. Pt was informed again status of phone when called.    Dr. Luis to determine if patient will have surgery today or will schedule for another date.      UPDATE: 11:35 AM  Patient is confirmed for an I & D today in the main OR to arrive approx 2 pm for a approx 4 pm surgery.    Patient has been notified and understands prep and where to be at the hospital.    Dr. Luis has been notified and is putting in her surgery orders.

## 2018-01-19 LAB
CYTO UR: NORMAL
LAB AP CASE REPORT: NORMAL
Lab: NORMAL
PATH REPORT.FINAL DX SPEC: NORMAL
PATH REPORT.GROSS SPEC: NORMAL

## 2018-01-20 LAB
BACTERIA SPEC AEROBE CULT: NORMAL
GRAM STN SPEC: NORMAL
GRAM STN SPEC: NORMAL

## 2018-01-22 LAB — BACTERIA SPEC ANAEROBE CULT: NORMAL

## 2018-01-30 ENCOUNTER — OFFICE VISIT (OUTPATIENT)
Dept: MAMMOGRAPHY | Facility: CLINIC | Age: 33
End: 2018-01-30

## 2018-01-30 VITALS
TEMPERATURE: 98.3 F | OXYGEN SATURATION: 99 % | RESPIRATION RATE: 16 BRPM | DIASTOLIC BLOOD PRESSURE: 62 MMHG | SYSTOLIC BLOOD PRESSURE: 100 MMHG | HEART RATE: 76 BPM

## 2018-01-30 DIAGNOSIS — N61.1 BREAST ABSCESS: Primary | ICD-10-CM

## 2018-01-30 PROCEDURE — 99024 POSTOP FOLLOW-UP VISIT: CPT | Performed by: SURGERY

## 2018-01-30 NOTE — PROGRESS NOTES
Chief Complaint: Diana Xie is a  33 y.o. female, initially referred by No ref. provider found , who is here today for a postoperative visit.    History of Present Illness:  In the interim,Diana Xie has had the following procedure and resultant pathology report: She is undergone incision and drainage of a right breast abscess with placement of a Penrose drain.  We did biopsy the wall of the abscess cavity and it showed acute and chronic inflammation but no atypical or malignant cells.  The cultures have not really grown anything out.  The patient is currently not on antibiotics.    She has noted no redness, warmth or swelling at the incision site. Denies fever or chills.      Current Outpatient Prescriptions:   •  doxycycline (MONODOX) 100 MG capsule, Take 1 capsule by mouth Every 12 (Twelve) Hours., Disp: 20 capsule, Rfl: 0  •  HYDROcodone-acetaminophen (NORCO) 5-325 MG per tablet, Take 1-2 tablets by mouth Every 4 (Four) Hours As Needed (Pain)., Disp: 20 tablet, Rfl: 0  •  ibuprofen (ADVIL,MOTRIN) 800 MG tablet, Take 800 mg by mouth Every 6 (Six) Hours As Needed for Mild Pain ., Disp: , Rfl:   Physical examination  Right breast-there is a nicely healing incision along the areolar edge with a Penrose drain in place.  I was able to remove all the stitches today and applied some Steri-Strips.  The drain was also removed without difficulty.    Assessment:  Right breast abscess status post I&D-the patient is healing nicely and the drain has been removed along with the stitches.    Plan:  I will see her on an as-needed basis.  She has been instructed to apply a dry dressing to the incision until it closes over which probably will take about 5 days.  She should begin mammography at 41.          EMR Dragon/transcription disclaimer:    Much of this encounter note is an electronic transcription/translocation of spoken language to printed text.  The electronic translation of spoken language may permit erroneous, or at  times, nonsensical words or phrases to be inadvertently transcribed.  Although I have reviewed the note from such areas, some may still exist.

## 2018-09-05 ENCOUNTER — OFFICE VISIT (OUTPATIENT)
Dept: OBSTETRICS AND GYNECOLOGY | Facility: CLINIC | Age: 33
End: 2018-09-05

## 2018-09-05 VITALS
HEIGHT: 67 IN | SYSTOLIC BLOOD PRESSURE: 126 MMHG | DIASTOLIC BLOOD PRESSURE: 76 MMHG | HEART RATE: 85 BPM | BODY MASS INDEX: 32.96 KG/M2 | WEIGHT: 210 LBS

## 2018-09-05 DIAGNOSIS — N89.8 VAGINAL DISCHARGE: Primary | ICD-10-CM

## 2018-09-05 PROCEDURE — 99213 OFFICE O/P EST LOW 20 MIN: CPT | Performed by: OBSTETRICS & GYNECOLOGY

## 2018-09-05 RX ORDER — FLUCONAZOLE 150 MG/1
150 TABLET ORAL ONCE
Qty: 1 TABLET | Refills: 2 | Status: SHIPPED | OUTPATIENT
Start: 2018-09-05 | End: 2018-09-10 | Stop reason: SDUPTHER

## 2018-09-05 NOTE — PROGRESS NOTES
"Subjective   Diana Xie is a 33 y.o. female with increased clear vaginal d/c with some irritation.    History of Present Illness     33 y.o.    - 2 week history of clear/increased discharge.   No odor  Irritation present   No treatment       Review of Systems   Constitutional: Negative for chills and fever.   Gastrointestinal: Negative for abdominal pain.   Genitourinary: Positive for vaginal discharge. Negative for frequency and vaginal bleeding.       Objective    /76   Pulse 85   Ht 170.2 cm (67\")   Wt 95.3 kg (210 lb)   LMP 2018 (Exact Date)   Breastfeeding? No   BMI 32.89 kg/m²   Physical Exam   Constitutional: She appears well-developed and well-nourished. No distress.   HENT:   Head: Normocephalic and atraumatic.   Abdominal: Soft. Bowel sounds are normal. She exhibits no distension. There is no tenderness.   Genitourinary: Pelvic exam was performed with patient supine. There is no lesion or Bartholin's cyst on the right labia. There is no lesion or Bartholin's cyst on the left labia. Uterus is anteverted. Uterus is not deviated, enlarged, fixed or exhibiting a mass.   Cervix is not parous. Right adnexum displays no mass, no tenderness and no fullness. Left adnexum displays no mass, no tenderness and no fullness. No bleeding in the vagina. Vaginal discharge (White thin discharge ) found.   Musculoskeletal: She exhibits no edema.   Lymphadenopathy:        Right: No inguinal adenopathy present.        Left: No inguinal adenopathy present.   Skin: Skin is warm and dry.   Psychiatric: She has a normal mood and affect. Her behavior is normal.         Assessment/Plan   Problems Addressed this Visit     None      Visit Diagnoses     Vaginal discharge    -  Primary    Relevant Orders    NuSwab VG+ - Swab, Vagina        - Check NuSwab  - Treat empirically for yeast  -Otherwise treat per results.     Expectations reviewed.     Hernandez Monteiro MD  2018  11:22 AM             "

## 2018-09-08 LAB
A VAGINAE DNA VAG QL NAA+PROBE: ABNORMAL SCORE
BVAB2 DNA VAG QL NAA+PROBE: ABNORMAL SCORE
C ALBICANS DNA VAG QL NAA+PROBE: NEGATIVE
C GLABRATA DNA VAG QL NAA+PROBE: NEGATIVE
C TRACH RRNA SPEC QL NAA+PROBE: NEGATIVE
MEGA1 DNA VAG QL NAA+PROBE: ABNORMAL SCORE
N GONORRHOEA RRNA SPEC QL NAA+PROBE: NEGATIVE
T VAGINALIS RRNA SPEC QL NAA+PROBE: POSITIVE

## 2018-09-10 ENCOUNTER — TELEPHONE (OUTPATIENT)
Dept: OBSTETRICS AND GYNECOLOGY | Facility: CLINIC | Age: 33
End: 2018-09-10

## 2018-09-10 RX ORDER — FLUCONAZOLE 150 MG/1
150 TABLET ORAL ONCE
Qty: 1 TABLET | Refills: 2 | Status: SHIPPED | OUTPATIENT
Start: 2018-09-10 | End: 2018-09-10

## 2018-09-10 RX ORDER — METRONIDAZOLE 500 MG/1
2000 TABLET ORAL ONCE
Qty: 4 TABLET | Refills: 1 | Status: SHIPPED | OUTPATIENT
Start: 2018-09-10 | End: 2018-09-10

## 2018-09-10 NOTE — TELEPHONE ENCOUNTER
----- Message from Hernandez Monteiro MD sent at 9/10/2018 12:27 PM EDT -----  Gabrielle +for trichomoniasis- Aware. Going to have annual and DENI in a few weeks. Thanks Dr. Monteiro

## 2019-04-15 ENCOUNTER — OFFICE VISIT (OUTPATIENT)
Dept: OBSTETRICS AND GYNECOLOGY | Facility: CLINIC | Age: 34
End: 2019-04-15

## 2019-04-15 VITALS
HEIGHT: 67 IN | SYSTOLIC BLOOD PRESSURE: 111 MMHG | HEART RATE: 80 BPM | DIASTOLIC BLOOD PRESSURE: 73 MMHG | WEIGHT: 218 LBS | BODY MASS INDEX: 34.21 KG/M2

## 2019-04-15 DIAGNOSIS — Z01.419 ENCOUNTER FOR GYNECOLOGICAL EXAMINATION WITHOUT ABNORMAL FINDING: Primary | ICD-10-CM

## 2019-04-15 DIAGNOSIS — Z72.0 TOBACCO USE: ICD-10-CM

## 2019-04-15 PROCEDURE — 99395 PREV VISIT EST AGE 18-39: CPT | Performed by: OBSTETRICS & GYNECOLOGY

## 2019-04-15 PROCEDURE — 99406 BEHAV CHNG SMOKING 3-10 MIN: CPT | Performed by: OBSTETRICS & GYNECOLOGY

## 2019-04-15 NOTE — PROGRESS NOTES
GYN Annual Exam     CC- Here for annual exam.     Diana Xie is a 34 y.o. female who presents for annual well woman exam. Periods are regular every 28-30 days, lasting 3 days. Dysmenorrhea:mild, occurring first 1-2 days of flow. Cyclic symptoms include none. No intermenstrual bleeding, spotting, or discharge.    OB History      Para Term  AB Living    3 3 1     3    SAB TAB Ectopic Molar Multiple Live Births                         Current contraception: tubal ligation  History of abnormal Pap smear: no  Family history of uterine, colon or ovarian cancer: yes - ovarian - grandmother   History of abnormal mammogram: no  Family history of breast cancer: no  Last Pap : 18 - NIL HPV negative     Past Medical History:   Diagnosis Date   • Staph infection     AFTER C SECTION        Past Surgical History:   Procedure Laterality Date   • BREAST ABSCESS INCISION AND DRAINAGE Right 2018    Procedure: RT. BREAST ABSCESS INCISION AND DRAINAGE;  Surgeon: Dylan Luis MD;  Location: [facility];  Service:    •  SECTION     • TUBAL ABDOMINAL LIGATION     • WISDOM TOOTH EXTRACTION         No current outpatient medications on file.    No Known Allergies    Social History     Tobacco Use   • Smoking status: Current Every Day Smoker     Types: Cigarettes   • Smokeless tobacco: Never Used   Substance Use Topics   • Alcohol use: Yes   • Drug use: No       Family History   Problem Relation Age of Onset   • Ovarian cancer Maternal Grandmother 35   • Breast cancer Neg Hx    • Uterine cancer Neg Hx    • Colon cancer Neg Hx    • Deep vein thrombosis Neg Hx    • Pulmonary embolism Neg Hx        Review of Systems   Constitutional: Negative for chills and fever.   Gastrointestinal: Negative for abdominal pain.   Genitourinary: Negative for dysuria, menstrual problem, pelvic pain, vaginal bleeding and vaginal discharge.   All other systems reviewed and are negative.      /73   Pulse 80    "Ht 170.2 cm (67.01\")   Wt 98.9 kg (218 lb)   LMP 03/25/2019 (Exact Date)   Breastfeeding? No   BMI 34.14 kg/m²     Physical Exam   Constitutional: She is oriented to person, place, and time. She appears well-developed and well-nourished. No distress. She appears overweight.   HENT:   Head: Normocephalic and atraumatic.   Eyes: Conjunctivae are normal. Right eye exhibits no discharge. Left eye exhibits no discharge.   Neck: Normal range of motion. Neck supple. No thyromegaly present.   Cardiovascular: Normal rate, regular rhythm and normal heart sounds.   No murmur heard.  Pulmonary/Chest: Effort normal and breath sounds normal. No respiratory distress. Right breast exhibits no inverted nipple, no mass and no nipple discharge. Left breast exhibits no inverted nipple, no mass and no nipple discharge.   Abdominal: Soft. Bowel sounds are normal. She exhibits no distension. There is no tenderness.   Genitourinary: Vagina normal and cervix normal. Pelvic exam was performed with patient supine. There is no lesion or Bartholin's cyst on the right labia. There is no lesion or Bartholin's cyst on the left labia. Uterus is anteverted. Uterus is not deviated, enlarged, fixed or exhibiting a mass.   Cervix is not parous. Cervix does not exhibit motion tenderness. Right adnexum displays no mass, no tenderness and no fullness. Left adnexum displays no mass, no tenderness and no fullness. No bleeding in the vagina. No vaginal discharge found.   Musculoskeletal: Normal range of motion. She exhibits no edema.   Lymphadenopathy:     She has no cervical adenopathy.        Right: No inguinal adenopathy present.        Left: No inguinal adenopathy present.   Neurological: She is alert and oriented to person, place, and time.   Skin: Skin is warm and dry. No rash noted.   Psychiatric: She has a normal mood and affect. Her behavior is normal. Judgment and thought content normal.            Assessment     1) GYN annual well woman exam. "   2) tobacco use  Tobacco abuse addressed    1) Increases risk for heart disease, COPD and lung cancer   2) many ways to stop including hypnosis, cold turkey, weaning but if interested in patches, nicotine gum, or medications like zyban and Chantix will need to see PCP  3) Recommend weaning as ideal way to try and reduce risk to stop   Typically encourage to set goals every two weeks with a reduction by 50% in use of tobacco. Once down to a few a day, set quit day in the same manor.   4) [place] quit now is a resource available to all.     I spent > 3 min face to face counseling about the use of tobacco abuse and why to quit.           Plan     1) Breast Health - Clinical breast exam yearly, Self breast awareness monthly  2) Pap - up to date   3) Smoking status- cessation encouraged   4) Activity recommends - Adult 150-300 min/week of multi-component physical activities that include balance training, aerobic and physical strengthening.  Disabled or ill adults should still try to fulfill these requirements, with modifications based on their conditions.  5) Follow up prn and one year.       Hernandez Monteiro MD   4/15/2019  12:44 PM

## 2019-04-15 NOTE — PATIENT INSTRUCTIONS
Steps to Quit Smoking  Smoking tobacco can be harmful to your health and can affect almost every organ in your body. Smoking puts you, and those around you, at risk for developing many serious chronic diseases. Quitting smoking is difficult, but it is one of the best things that you can do for your health. It is never too late to quit.  What are the benefits of quitting smoking?  When you quit smoking, you lower your risk of developing serious diseases and conditions, such as:  · Lung cancer or lung disease, such as COPD.  · Heart disease.  · Stroke.  · Heart attack.  · Infertility.  · Osteoporosis and bone fractures.    Additionally, symptoms such as coughing, wheezing, and shortness of breath may get better when you quit. You may also find that you get sick less often because your body is stronger at fighting off colds and infections. If you are pregnant, quitting smoking can help to reduce your chances of having a baby of low birth weight.  How do I get ready to quit?  When you decide to quit smoking, create a plan to make sure that you are successful. Before you quit:  · Pick a date to quit. Set a date within the next two weeks to give you time to prepare.  · Write down the reasons why you are quitting. Keep this list in places where you will see it often, such as on your bathroom mirror or in your car or wallet.  · Identify the people, places, things, and activities that make you want to smoke (triggers) and avoid them. Make sure to take these actions:  ? Throw away all cigarettes at home, at work, and in your car.  ? Throw away smoking accessories, such as ashtrays and lighters.  ? Clean your car and make sure to empty the ashtray.  ? Clean your home, including curtains and carpets.  · Tell your family, friends, and coworkers that you are quitting. Support from your loved ones can make quitting easier.  · Talk with your health care provider about your options for quitting smoking.  · Find out what  treatment options are covered by your health insurance.    What strategies can I use to quit smoking?  Talk with your healthcare provider about different strategies to quit smoking. Some strategies include:  · Quitting smoking altogether instead of gradually lessening how much you smoke over a period of time. Research shows that quitting “cold turkey” is more successful than gradually quitting.  · Attending in-person counseling to help you build problem-solving skills. You are more likely to have success in quitting if you attend several counseling sessions. Even short sessions of 10 minutes can be effective.  · Finding resources and support systems that can help you to quit smoking and remain smoke-free after you quit. These resources are most helpful when you use them often. They can include:  ? Online chats with a counselor.  ? Telephone quitlines.  ? Printed self-help materials.  ? Support groups or group counseling.  ? Text messaging programs.  ? Mobile phone applications.  · Taking medicines to help you quit smoking. (If you are pregnant or breastfeeding, talk with your health care provider first.) Some medicines contain nicotine and some do not. Both types of medicines help with cravings, but the medicines that include nicotine help to relieve withdrawal symptoms. Your health care provider may recommend:  ? Nicotine patches, gum, or lozenges.  ? Nicotine inhalers or sprays.  ? Non-nicotine medicine that is taken by mouth.    Talk with your health care provider about combining strategies, such as taking medicines while you are also receiving in-person counseling. Using these two strategies together makes you more likely to succeed in quitting than if you used either strategy on its own.  If you are pregnant or breastfeeding, talk with your health care provider about finding counseling or other support strategies to quit smoking. Do not take medicine to help you quit smoking unless told to do so by your health  care provider.  What things can I do to make it easier to quit?  Quitting smoking might feel overwhelming at first, but there is a lot that you can do to make it easier. Take these important actions:  · Reach out to your family and friends and ask that they support and encourage you during this time. Call telephone quitlines, reach out to support groups, or work with a counselor for support.  · Ask people who smoke to avoid smoking around you.  · Avoid places that trigger you to smoke, such as bars, parties, or smoke-break areas at work.  · Spend time around people who do not smoke.  · Lessen stress in your life, because stress can be a smoking trigger for some people. To lessen stress, try:  ? Exercising regularly.  ? Deep-breathing exercises.  ? Yoga.  ? Meditating.  ? Performing a body scan. This involves closing your eyes, scanning your body from head to toe, and noticing which parts of your body are particularly tense. Purposefully relax the muscles in those areas.  · Download or purchase mobile phone or tablet apps (applications) that can help you stick to your quit plan by providing reminders, tips, and encouragement. There are many free apps, such as QuitGuide from the CDC (Centers for Disease Control and Prevention). You can find other support for quitting smoking (smoking cessation) through smokefree.gov and other websites.    How will I feel when I quit smoking?  Within the first 24 hours of quitting smoking, you may start to feel some withdrawal symptoms. These symptoms are usually most noticeable 2-3 days after quitting, but they usually do not last beyond 2-3 weeks. Changes or symptoms that you might experience include:  · Mood swings.  · Restlessness, anxiety, or irritation.  · Difficulty concentrating.  · Dizziness.  · Strong cravings for sugary foods in addition to nicotine.  · Mild weight gain.  · Constipation.  · Nausea.  · Coughing or a sore throat.  · Changes in how your medicines work in your  body.  · A depressed mood.  · Difficulty sleeping (insomnia).    After the first 2-3 weeks of quitting, you may start to notice more positive results, such as:  · Improved sense of smell and taste.  · Decreased coughing and sore throat.  · Slower heart rate.  · Lower blood pressure.  · Clearer skin.  · The ability to breathe more easily.  · Fewer sick days.    Quitting smoking is very challenging for most people. Do not get discouraged if you are not successful the first time. Some people need to make many attempts to quit before they achieve long-term success. Do your best to stick to your quit plan, and talk with your health care provider if you have any questions or concerns.  This information is not intended to replace advice given to you by your health care provider. Make sure you discuss any questions you have with your health care provider.  Document Released: 12/12/2002 Document Revised: 07/24/2018 Document Reviewed: 05/03/2016  [employer] Interactive Patient Education © 2019 [employer]

## 2019-09-12 ENCOUNTER — TELEPHONE (OUTPATIENT)
Dept: OBSTETRICS AND GYNECOLOGY | Facility: CLINIC | Age: 34
End: 2019-09-12

## 2019-09-12 NOTE — TELEPHONE ENCOUNTER
Pt agrees to 9:00am on 9/13/19.    Elizabeth, will you please overbook pt, ok per JOSE.      Thanks!  Elke

## 2019-09-12 NOTE — TELEPHONE ENCOUNTER
Pt called to scheduled appt for discharge and irritation.  I scheduled her for 9/18/19, but she would like to be seen tomorrow, if possible.  Can you offer a good time, or should she keep appt on 9/18/19?    Thanks,   Elke    Pt # [phone]

## 2019-09-13 ENCOUNTER — OFFICE VISIT (OUTPATIENT)
Dept: OBSTETRICS AND GYNECOLOGY | Facility: CLINIC | Age: 34
End: 2019-09-13

## 2019-09-13 VITALS
SYSTOLIC BLOOD PRESSURE: 130 MMHG | HEIGHT: 67 IN | HEART RATE: 73 BPM | BODY MASS INDEX: 33.43 KG/M2 | WEIGHT: 213 LBS | DIASTOLIC BLOOD PRESSURE: 85 MMHG

## 2019-09-13 DIAGNOSIS — R10.2 PELVIC PAIN: ICD-10-CM

## 2019-09-13 DIAGNOSIS — N93.9 ABNORMAL UTERINE BLEEDING (AUB): Primary | ICD-10-CM

## 2019-09-13 PROCEDURE — 99214 OFFICE O/P EST MOD 30 MIN: CPT | Performed by: OBSTETRICS & GYNECOLOGY

## 2019-09-13 NOTE — PROGRESS NOTES
"Subjective   Diana Xie is a 34 y.o. female c/o irregular periods for the past 4 months. Periods are every 17 days, lasting 4-5 days, heavy flow with clots and increased cramping. Pt is having cramping when she is not on her period.      History of Present Illness     34 y.o.   Menarche at 14 years of age.   15-18 days x 5 days of bleeding   Heavy day - 6 pads   + clots  Dysmenorrhea - both on and off cycle now   Has not had to miss work  Tubal ligation for contraception   New problem for last 4 months (prior to that 30 x 3)     The following portions of the patient's history were reviewed and updated as appropriate: allergies, current medications, past family history, past medical history, past social history, past surgical history and problem list.    Review of Systems   Eyes: Negative for visual disturbance.   Gastrointestinal: Negative for constipation and diarrhea.   Genitourinary: Positive for menstrual problem, pelvic pain, vaginal bleeding and vaginal discharge. Negative for dysuria.   Neurological: Positive for headache.       Objective    /85   Pulse 73   Ht 170.2 cm (67\")   Wt 96.6 kg (213 lb)   LMP 2019 (Exact Date)   Breastfeeding? No   BMI 33.36 kg/m²   Physical Exam   Constitutional: She appears well-developed and well-nourished. No distress.   HENT:   Head: Normocephalic and atraumatic.   Abdominal: Soft. Bowel sounds are normal. She exhibits no distension. There is no tenderness.   Genitourinary: Pelvic exam was performed with patient supine. There is no lesion or Bartholin's cyst on the right labia. There is no lesion or Bartholin's cyst on the left labia. Uterus is enlarged (8-9 weeks ) and anteverted. Uterus is not deviated, fixed or exhibiting a mass. Cervix does not exhibit motion tenderness or friability. Right adnexum displays no mass, no tenderness and no fullness. Left adnexum displays no mass, no tenderness and no fullness. No bleeding in the vagina. No vaginal " discharge found.   Musculoskeletal: She exhibits no edema.   Lymphadenopathy:        Right: No inguinal adenopathy present.        Left: No inguinal adenopathy present.   Skin: Skin is warm and dry.   Psychiatric: She has a normal mood and affect. Her behavior is normal.         Assessment/Plan   Problems Addressed this Visit     None      Visit Diagnoses     Abnormal uterine bleeding (AUB)    -  Primary    Relevant Orders    NuSwab VG+ - Swab, Vagina    HCG, B-subunit, Quantitative    TSH Rfx On Abnormal To Free T4    Prolactin    CBC & Differential    Pelvic pain        Relevant Orders    NuSwab VG+ - Swab, Vagina    HCG, B-subunit, Quantitative    TSH Rfx On Abnormal To Free T4    Prolactin    CBC & Differential            Work up for causes of bleeding   Discussed expectations as well as treatment options if needed.   Going to follow up and try and get plan of care after ultrasound for AUB     Hernandez Monteiro MD  9/13/2019  9:53 AM

## 2019-09-15 LAB
BACTERIA UR CULT: NO GROWTH
BACTERIA UR CULT: NORMAL

## 2019-09-17 ENCOUNTER — TELEPHONE (OUTPATIENT)
Dept: OBSTETRICS AND GYNECOLOGY | Facility: CLINIC | Age: 34
End: 2019-09-17

## 2019-09-17 LAB
A VAGINAE DNA VAG QL NAA+PROBE: ABNORMAL SCORE
BVAB2 DNA VAG QL NAA+PROBE: ABNORMAL SCORE
C ALBICANS DNA VAG QL NAA+PROBE: NEGATIVE
C GLABRATA DNA VAG QL NAA+PROBE: NEGATIVE
C TRACH RRNA SPEC QL NAA+PROBE: NEGATIVE
MEGA1 DNA VAG QL NAA+PROBE: ABNORMAL SCORE
N GONORRHOEA RRNA SPEC QL NAA+PROBE: NEGATIVE
T VAGINALIS RRNA SPEC QL NAA+PROBE: NEGATIVE

## 2019-09-17 RX ORDER — METRONIDAZOLE 500 MG/1
500 TABLET ORAL 2 TIMES DAILY
Qty: 14 TABLET | Refills: 4 | Status: SHIPPED | OUTPATIENT
Start: 2019-09-17 | End: 2019-09-24

## 2019-09-17 NOTE — TELEPHONE ENCOUNTER
I advised pt of normal urine culture. She doesn't trust that is only what you are calling about because she said she has never received a call with normal results. She also asked if she should keep her US and FU appt and I explained yes unless her problems are resolved. She responded with please have MA call me because I would rather speak with her.

## 2019-09-17 NOTE — TELEPHONE ENCOUNTER
----- Message from Hernandez Monteiro MD sent at 9/17/2019 11:28 AM EDT -----  Gabrielle, she has BV on culture, Rx sent to the pharmacy. Please let her know. Thanks, Dr. Monteiro

## 2019-09-17 NOTE — TELEPHONE ENCOUNTER
----- Message from Gabrielle Sandy MA sent at 9/16/2019  5:03 PM EDT -----  L/m for pt/marc  ----- Message -----  From: Hernandez Monteiro MD  Sent: 9/16/2019   7:34 AM  To: KI Casper, normal urine culture. Please let her know. Thanks Dr. Monteiro

## 2019-10-02 ENCOUNTER — PROCEDURE VISIT (OUTPATIENT)
Dept: OBSTETRICS AND GYNECOLOGY | Facility: CLINIC | Age: 34
End: 2019-10-02

## 2019-10-02 ENCOUNTER — OFFICE VISIT (OUTPATIENT)
Dept: OBSTETRICS AND GYNECOLOGY | Facility: CLINIC | Age: 34
End: 2019-10-02

## 2019-10-02 VITALS
HEART RATE: 66 BPM | DIASTOLIC BLOOD PRESSURE: 82 MMHG | BODY MASS INDEX: 33.59 KG/M2 | HEIGHT: 67 IN | WEIGHT: 214 LBS | SYSTOLIC BLOOD PRESSURE: 122 MMHG

## 2019-10-02 DIAGNOSIS — N85.8 OTHER SPECIFIED NONINFLAMMATORY DISORDERS OF UTERUS: ICD-10-CM

## 2019-10-02 DIAGNOSIS — R10.2 PELVIC PAIN: Primary | ICD-10-CM

## 2019-10-02 DIAGNOSIS — R10.2 PELVIC PAIN: ICD-10-CM

## 2019-10-02 DIAGNOSIS — N93.9 ABNORMAL UTERINE BLEEDING (AUB): ICD-10-CM

## 2019-10-02 DIAGNOSIS — N93.9 ABNORMAL UTERINE BLEEDING (AUB): Primary | ICD-10-CM

## 2019-10-02 LAB
BASOPHILS # BLD AUTO: 0.02 10*3/MM3 (ref 0–0.2)
BASOPHILS NFR BLD AUTO: 0.2 % (ref 0–1.5)
EOSINOPHIL # BLD AUTO: 0.25 10*3/MM3 (ref 0–0.4)
EOSINOPHIL NFR BLD AUTO: 3 % (ref 0.3–6.2)
ERYTHROCYTE [DISTWIDTH] IN BLOOD BY AUTOMATED COUNT: 13.3 % (ref 12.3–15.4)
HCT VFR BLD AUTO: 37.5 % (ref 34–46.6)
HGB BLD-MCNC: 11.8 G/DL (ref 12–15.9)
IMM GRANULOCYTES # BLD AUTO: 0.02 10*3/MM3 (ref 0–0.05)
IMM GRANULOCYTES NFR BLD AUTO: 0.2 % (ref 0–0.5)
LYMPHOCYTES # BLD AUTO: 3.12 10*3/MM3 (ref 0.7–3.1)
LYMPHOCYTES NFR BLD AUTO: 37.7 % (ref 19.6–45.3)
MCH RBC QN AUTO: 28.2 PG (ref 26.6–33)
MCHC RBC AUTO-ENTMCNC: 31.5 G/DL (ref 31.5–35.7)
MCV RBC AUTO: 89.7 FL (ref 79–97)
MONOCYTES # BLD AUTO: 0.55 10*3/MM3 (ref 0.1–0.9)
MONOCYTES NFR BLD AUTO: 6.6 % (ref 5–12)
NEUTROPHILS # BLD AUTO: 4.32 10*3/MM3 (ref 1.7–7)
NEUTROPHILS NFR BLD AUTO: 52.3 % (ref 42.7–76)
NRBC BLD AUTO-RTO: 0 /100 WBC (ref 0–0.2)
PLATELET # BLD AUTO: 296 10*3/MM3 (ref 140–450)
RBC # BLD AUTO: 4.18 10*6/MM3 (ref 3.77–5.28)
WBC # BLD AUTO: 8.28 10*3/MM3 (ref 3.4–10.8)

## 2019-10-02 PROCEDURE — 76830 TRANSVAGINAL US NON-OB: CPT | Performed by: OBSTETRICS & GYNECOLOGY

## 2019-10-02 PROCEDURE — 99213 OFFICE O/P EST LOW 20 MIN: CPT | Performed by: OBSTETRICS & GYNECOLOGY

## 2019-10-02 NOTE — PROGRESS NOTES
"Subjective   Diana Xie is a 34 y.o. female following up from US for AUB.     History of Present Illness     34 y.o.    Menarche at 14 years of age.   15-18 days x 5 days of bleeding   Heavy day - 6 pads   + clots  Dysmenorrhea - both on and off cycle now   Has not had to miss work  Tubal ligation for contraception   New problem for last 4 months (prior to that 30 x 3)      times 3         Review of Systems   Constitutional: Negative for chills and fever.   Gastrointestinal: Negative for abdominal pain, constipation and diarrhea.   Genitourinary: Positive for menstrual problem and vaginal bleeding. Negative for pelvic pain and vaginal discharge.   All other systems reviewed and are negative.      Objective    /82   Pulse 66   Ht 170.2 cm (67\")   Wt 97.1 kg (214 lb)   Breastfeeding? No   BMI 33.52 kg/m²   Physical Exam   Constitutional: She appears well-developed and well-nourished. No distress.   Abdominal: Soft. Bowel sounds are normal. She exhibits no distension. There is no tenderness.   Musculoskeletal: Normal range of motion. She exhibits no edema.   Skin: Skin is warm and dry. No rash noted. No erythema.   Psychiatric: She has a normal mood and affect. Her behavior is normal.     NuSwab with BV   Negative urine culture   Did not do other blood draw     Ultrasound 9.8 cm   EL at 0.61 cm   Fluid in mid uterus 0.29 x 0.99 cm (uterine niche? From sections)   No cyst or mass (No fibroids)       Assessment/Plan   Problems Addressed this Visit     None      Visit Diagnoses     Abnormal uterine bleeding (AUB)    -  Primary    Pelvic pain        Other specified noninflammatory disorders of uterus            1) AUB and pelvic pain   Results reviewed.   Suspect combination of uterine niche (seen on ultrasound) and general enlargement.   Treatment options   - expectant management  - Medications (IUD or OCP -smoker)   - surgical (ablation)   Not ready to treat yet.   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 Could be part of the cause     Hernandez Monteiro MD  10/2/2019  11:46 AM

## 2019-10-03 ENCOUNTER — TELEPHONE (OUTPATIENT)
Dept: OBSTETRICS AND GYNECOLOGY | Facility: CLINIC | Age: 34
End: 2019-10-03

## 2019-10-03 LAB
HCG INTACT+B SERPL-ACNC: <0.5 MIU/ML
PROLACTIN SERPL-MCNC: 4.6 NG/ML (ref 4.8–23.3)
TSH SERPL DL<=0.005 MIU/L-ACNC: 0.69 UIU/ML (ref 0.27–4.2)

## 2019-10-03 NOTE — TELEPHONE ENCOUNTER
----- Message from Hernandez Monteiro MD sent at 10/3/2019  8:23 AM EDT -----  Gabrielle, her blood work for abnormal uterine bleeding is negative. Please let her know. Thanks, Dr. Monteiro

## 2019-10-09 ENCOUNTER — TELEPHONE (OUTPATIENT)
Dept: OBSTETRICS AND GYNECOLOGY | Facility: CLINIC | Age: 34
End: 2019-10-09

## 2019-10-09 RX ORDER — FLUCONAZOLE 150 MG/1
150 TABLET ORAL ONCE
Qty: 1 TABLET | Refills: 2 | Status: SHIPPED | OUTPATIENT
Start: 2019-10-09 | End: 2019-10-09

## 2019-10-09 NOTE — TELEPHONE ENCOUNTER
Edita,     Medication sent to pharmacy as requested.     Thanks   Dr. Monteiro    PT called and requesting a RX for yeast infection. She finished her antibiotic that was given to her 09/17/2019 and states that it gave her a yeast infection.     Pharmacy is in chart.     Thank you

## 2020-05-28 ENCOUNTER — OFFICE VISIT (OUTPATIENT)
Dept: OBSTETRICS AND GYNECOLOGY | Facility: CLINIC | Age: 35
End: 2020-05-28

## 2020-05-28 VITALS
HEIGHT: 67 IN | DIASTOLIC BLOOD PRESSURE: 74 MMHG | WEIGHT: 218 LBS | HEART RATE: 73 BPM | BODY MASS INDEX: 34.21 KG/M2 | SYSTOLIC BLOOD PRESSURE: 118 MMHG

## 2020-05-28 DIAGNOSIS — Z72.0 TOBACCO USE: ICD-10-CM

## 2020-05-28 DIAGNOSIS — Z01.419 ENCOUNTER FOR GYNECOLOGICAL EXAMINATION WITHOUT ABNORMAL FINDING: Primary | ICD-10-CM

## 2020-05-28 PROCEDURE — 99395 PREV VISIT EST AGE 18-39: CPT | Performed by: OBSTETRICS & GYNECOLOGY

## 2020-05-28 NOTE — PROGRESS NOTES
GYN Annual Exam     CC- Here for annual exam.     Diana Xie is a 35 y.o. female who presents for annual well woman exam. Periods are regular every 28-30 days, lasting 3 days. Dysmenorrhea:mild, occurring first 1-2 days of flow. Cyclic symptoms include none. No intermenstrual bleeding, spotting, or discharge.    OB History        3    Para   3    Term   1            AB        Living   3       SAB        TAB        Ectopic        Molar        Multiple        Live Births                    Current contraception: tubal ligation  History of abnormal Pap smear: Yes, no treatment required  Family history of uterine, colon or ovarian cancer: yes - ovarian cancer   History of abnormal mammogram: no  Family history of breast cancer: no  Last Pap : 2017 NL HPV neg    Past Medical History:   Diagnosis Date   • Staph infection     AFTER C SECTION        Past Surgical History:   Procedure Laterality Date   • BREAST ABSCESS INCISION AND DRAINAGE Right 2018    Procedure: RT. BREAST ABSCESS INCISION AND DRAINAGE;  Surgeon: Dylan Luis MD;  Location: [facility];  Service:    •  SECTION     • TUBAL ABDOMINAL LIGATION     • WISDOM TOOTH EXTRACTION         No current outpatient medications on file.    No Known Allergies    Social History     Tobacco Use   • Smoking status: Current Every Day Smoker     Types: Cigarettes   • Smokeless tobacco: Never Used   Substance Use Topics   • Alcohol use: Yes   • Drug use: No       Family History   Problem Relation Age of Onset   • Ovarian cancer Maternal Grandmother 35   • Breast cancer Neg Hx    • Uterine cancer Neg Hx    • Colon cancer Neg Hx    • Deep vein thrombosis Neg Hx    • Pulmonary embolism Neg Hx        Review of Systems   Constitutional: Negative for chills and fever.   Gastrointestinal: Negative for abdominal pain, constipation and diarrhea.   Genitourinary: Negative for menstrual problem, pelvic pain, vaginal bleeding and vaginal  "discharge.   All other systems reviewed and are negative.      /74   Pulse 73   Ht 170.2 cm (67\")   Wt 98.9 kg (218 lb)   LMP 05/17/2020   Breastfeeding No   BMI 34.14 kg/m²     Physical Exam   Constitutional: She is oriented to person, place, and time. She appears well-developed and well-nourished. No distress. She appears overweight.   HENT:   Head: Normocephalic and atraumatic.   Eyes: Conjunctivae are normal. Right eye exhibits no discharge. Left eye exhibits no discharge.   Neck: Normal range of motion. Neck supple. No thyromegaly present.   Cardiovascular: Normal rate, regular rhythm and normal heart sounds.   No murmur heard.  Pulmonary/Chest: Effort normal and breath sounds normal. No respiratory distress. Right breast exhibits no inverted nipple, no mass and no nipple discharge. Left breast exhibits no inverted nipple, no mass and no nipple discharge.   Abdominal: Soft. Bowel sounds are normal. She exhibits no distension. There is no tenderness.   Genitourinary: Vagina normal and cervix normal. Pelvic exam was performed with patient supine. There is no lesion or Bartholin's cyst on the right labia. There is no lesion or Bartholin's cyst on the left labia. Uterus is anteverted. Uterus is not deviated, enlarged, fixed or exhibiting a mass. Cervix does not exhibit motion tenderness or friability. Right adnexum displays no mass, no tenderness and no fullness. Left adnexum displays no mass, no tenderness and no fullness. No bleeding in the vagina. No vaginal discharge found.   Musculoskeletal: Normal range of motion. She exhibits no edema.   Lymphadenopathy:     She has no cervical adenopathy.        Right: No inguinal adenopathy present.        Left: No inguinal adenopathy present.   Neurological: She is alert and oriented to person, place, and time.   Skin: Skin is warm and dry. No rash noted.   Psychiatric: She has a normal mood and affect. Her behavior is normal. Judgment and thought content " normal.            Assessment     1) GYN annual well woman exam.   2) Tobacco abuse addressed    1) Increases risk for heart disease, COPD and lung cancer   2) many ways to stop including hypnosis, cold turkey, weaning but if interested in patches, nicotine gum, or medications like zyban and Chantix will need to see PCP  3) Recommend weaning as ideal way to try and reduce risk to stop   Typically encourage to set goals every two weeks with a reduction by 50% in use of tobacco. Once down to a few a day, set quit day in the same manor.        Plan     1) Breast Health - Clinical breast exam yearly, Self breast awareness monthly  2) Pap - updated today   3) Smoking status- cessation encouraged   4) Activity recommends - Adult 150-300 min/week of multi-component physical activities that include balance training, aerobic and physical strengthening.    Avoidance of distracted driving issues (texts, phone calls).   5) Follow up prn and one year.       Hernandez Monteiro MD   5/28/2020  16:09

## 2020-05-28 NOTE — PATIENT INSTRUCTIONS
Steps to Quit Smoking  Smoking tobacco is the leading cause of preventable death. It can affect almost every organ in the body. Smoking puts you and those around you at risk for developing many serious chronic diseases. Quitting smoking can be difficult, but it is one of the best things that you can do for your health. It is never too late to quit.  How do I get ready to quit?  When you decide to quit smoking, create a plan to help you succeed. Before you quit:  · Pick a date to quit. Set a date within the next 2 weeks to give you time to prepare.  · Write down the reasons why you are quitting. Keep this list in places where you will see it often.  · Tell your family, friends, and co-workers that you are quitting. Support from your loved ones can make quitting easier.  · Talk with your health care provider about your options for quitting smoking.  · Find out what treatment options are covered by your health insurance.  · Identify people, places, things, and activities that make you want to smoke (triggers). Avoid them.  What first steps can I take to quit smoking?  · Throw away all cigarettes at home, at work, and in your car.  · Throw away smoking accessories, such as ashtrays and lighters.  · Clean your car. Make sure to empty the ashtray.  · Clean your home, including curtains and carpets.  What strategies can I use to quit smoking?  Talk with your health care provider about combining strategies, such as taking medicines while you are also receiving in-person counseling. Using these two strategies together makes you more likely to succeed in quitting than if you used either strategy on its own.  · If you are pregnant or breastfeeding, talk with your health care provider about finding counseling or other support strategies to quit smoking. Do not take medicine to help you quit smoking unless your health care provider tells you to do so.  To quit smoking:  Quit right away  · Quit smoking completely, instead of  gradually reducing how much you smoke over a period of time. Research shows that stopping smoking right away is more successful than gradually quitting.  · Attend in-person counseling to help you build problem-solving skills. You are more likely to succeed in quitting if you attend counseling sessions regularly. Even short sessions of 10 minutes can be effective.  Take medicine  You may take medicines to help you quit smoking. Some medicines require a prescription and some you can purchase over-the-counter. Medicines may have nicotine in them to replace the nicotine in cigarettes. Medicines may:  · Help to stop cravings.  · Help to relieve withdrawal symptoms.  Your health care provider may recommend:  · Nicotine patches, gum, or lozenges.  · Nicotine inhalers or sprays.  · Non-nicotine medicine that is taken by mouth.  Find resources  Find resources and support systems that can help you to quit smoking and remain smoke-free after you quit. These resources are most helpful when you use them often. They include:  · Online chats with a counselor.  · Telephone quitlines.  · Printed self-help materials.  · Support groups or group counseling.  · Text messaging programs.  · Mobile phone apps or applications. Use apps that can help you stick to your quit plan by providing reminders, tips, and encouragement. There are many free apps for mobile devices as well as websites. Examples include Quit Guide from the CDC and smokefree.gov  What things can I do to make it easier to quit?    · Reach out to your family and friends for support and encouragement. Call telephone quitlines (8-154-QUIT-NOW), reach out to support groups, or work with a counselor for support.  · Ask people who smoke to avoid smoking around you.  · Avoid places that trigger you to smoke, such as bars, parties, or smoke-break areas at work.  · Spend time with people who do not smoke.  · Lessen the stress in your life. Stress can be a smoking trigger for some  people. To lessen stress, try:  ? Exercising regularly.  ? Doing deep-breathing exercises.  ? Doing yoga.  ? Meditating.  ? Performing a body scan. This involves closing your eyes, scanning your body from head to toe, and noticing which parts of your body are particularly tense. Try to relax the muscles in those areas.  How will I feel when I quit smoking?  Day 1 to 3 weeks  Within the first 24 hours of quitting smoking, you may start to feel withdrawal symptoms. These symptoms are usually most noticeable 2-3 days after quitting, but they usually do not last for more than 2-3 weeks. You may experience these symptoms:  · Mood swings.  · Restlessness, anxiety, or irritability.  · Trouble concentrating.  · Dizziness.  · Strong cravings for sugary foods and nicotine.  · Mild weight gain.  · Constipation.  · Nausea.  · Coughing or a sore throat.  · Changes in how the medicines that you take for unrelated issues work in your body.  · Depression.  · Trouble sleeping (insomnia).  Week 3 and afterward  After the first 2-3 weeks of quitting, you may start to notice more positive results, such as:  · Improved sense of smell and taste.  · Decreased coughing and sore throat.  · Slower heart rate.  · Lower blood pressure.  · Clearer skin.  · The ability to breathe more easily.  · Fewer sick days.  Quitting smoking can be very challenging. Do not get discouraged if you are not successful the first time. Some people need to make many attempts to quit before they achieve long-term success. Do your best to stick to your quit plan, and talk with your health care provider if you have any questions or concerns.  Summary  · Smoking tobacco is the leading cause of preventable death. Quitting smoking is one of the best things that you can do for your health.  · When you decide to quit smoking, create a plan to help you succeed.  · Quit smoking right away, not slowly over a period of time.  · When you start quitting, seek help from your  health care provider, family, or friends.  This information is not intended to replace advice given to you by your health care provider. Make sure you discuss any questions you have with your health care provider.  Document Released: 12/12/2002 Document Revised: 03/06/2020 Document Reviewed: 03/07/2020  Elsevier Patient Education © 2020 [employer]

## 2020-06-02 LAB
CYTOLOGIST CVX/VAG CYTO: NORMAL
CYTOLOGY CVX/VAG DOC CYTO: NORMAL
CYTOLOGY CVX/VAG DOC THIN PREP: NORMAL
DX ICD CODE: NORMAL
DX ICD CODE: NORMAL
HIV 1 & 2 AB SER-IMP: NORMAL
HPV I/H RISK 1 DNA CVX QL PROBE+SIG AMP: NEGATIVE
OTHER STN SPEC: NORMAL
PATHOLOGIST CVX/VAG CYTO: NORMAL
STAT OF ADQ CVX/VAG CYTO-IMP: NORMAL

## 2020-09-17 ENCOUNTER — TELEPHONE (OUTPATIENT)
Dept: OBSTETRICS AND GYNECOLOGY | Facility: CLINIC | Age: 35
End: 2020-09-17

## 2020-09-17 NOTE — TELEPHONE ENCOUNTER
"Pt asking that you call her, she has a few questions.    FYI-her daughter Malia Xie (15 yo) was scheduled for New  appt with Dr. Agosto today.  She did not want to be seen without her mother in the room with her, so Dr. Agosto asked her to r/s, due to no visitors allowed in the office at this time.  Diana did not want to r/s the appt, states Malia is having \"problems\" and wants her to be seen today, then they left the office.      Pt # [phone]  "

## 2021-07-12 ENCOUNTER — TELEPHONE (OUTPATIENT)
Dept: OBSTETRICS AND GYNECOLOGY | Facility: CLINIC | Age: 36
End: 2021-07-12

## 2021-08-20 ENCOUNTER — TELEPHONE (OUTPATIENT)
Dept: OBSTETRICS AND GYNECOLOGY | Facility: CLINIC | Age: 36
End: 2021-08-20

## 2021-11-10 ENCOUNTER — OFFICE VISIT (OUTPATIENT)
Dept: OBSTETRICS AND GYNECOLOGY | Facility: CLINIC | Age: 36
End: 2021-11-10

## 2021-11-10 VITALS
DIASTOLIC BLOOD PRESSURE: 77 MMHG | WEIGHT: 213 LBS | SYSTOLIC BLOOD PRESSURE: 124 MMHG | HEIGHT: 66 IN | BODY MASS INDEX: 34.23 KG/M2

## 2021-11-10 DIAGNOSIS — N89.8 VAGINAL DISCHARGE: Primary | ICD-10-CM

## 2021-11-10 DIAGNOSIS — N89.8 VAGINAL ODOR: ICD-10-CM

## 2021-11-10 PROCEDURE — 99213 OFFICE O/P EST LOW 20 MIN: CPT | Performed by: NURSE PRACTITIONER

## 2021-11-10 RX ORDER — METRONIDAZOLE 500 MG/1
500 TABLET ORAL 2 TIMES DAILY
Qty: 14 TABLET | Refills: 0 | Status: SHIPPED | OUTPATIENT
Start: 2021-11-10 | End: 2021-11-17

## 2021-11-10 RX ORDER — FLUCONAZOLE 150 MG/1
150 TABLET ORAL ONCE
Qty: 1 TABLET | Refills: 2 | Status: SHIPPED | OUTPATIENT
Start: 2021-11-10 | End: 2021-11-10

## 2021-11-10 NOTE — PROGRESS NOTES
Chief Complaint   Patient presents with   • Vaginal Discharge     Pt c/o vaginal discharge with odor after intercourse and irritation.         SUBJECTIVE:     Diana Xie is a 36 y.o.  who presents with c/o vaginal discharge, white in color on 2 separate occasions. Symptoms started 3 weeks ago. She noticed an odor following intercourse on two occasions in the last several weeks. c/o vaginal irritation. Denies itching. She denies new partners. She is not using condoms with intercourse. This is a new problem. LMP 10/26/21. DDenies a change in soaps, hygiene products. She is not using douches. Denies pelvic pain or dysuria.     Past Medical History:   Diagnosis Date   • Staph infection     AFTER C SECTION       Past Surgical History:   Procedure Laterality Date   • BREAST ABSCESS INCISION AND DRAINAGE Right 2018    Procedure: RT. BREAST ABSCESS INCISION AND DRAINAGE;  Surgeon: Dylan Luis MD;  Location: [facility];  Service:    •  SECTION     • TUBAL ABDOMINAL LIGATION     • WISDOM TOOTH EXTRACTION        Social History     Tobacco Use   • Smoking status: Former Smoker     Types: Cigarettes   • Smokeless tobacco: Never Used   Substance Use Topics   • Alcohol use: Yes   • Drug use: No     OB History    Para Term  AB Living   3 3 1     3   SAB IAB Ectopic Molar Multiple Live Births                    # Outcome Date GA Lbr David/2nd Weight Sex Delivery Anes PTL Lv   3 Term      CS-LTranv      2 Para      CS-LTranv      1 Para      CS-LTranv           Review of Systems   Constitutional: Negative for chills, fatigue and fever.   Gastrointestinal: Negative for abdominal distention, abdominal pain, nausea and vomiting.   Genitourinary: Positive for vaginal discharge. Negative for dyspareunia, dysuria, menstrual problem, pelvic pain, vaginal bleeding and vaginal pain.        + vaginal odor  - vaginal itching   Musculoskeletal: Negative for gait problem.   Skin: Negative for  "rash.       OBJECTIVE:   Vitals:    11/10/21 1037   BP: 124/77   Weight: 96.6 kg (213 lb)   Height: 167.6 cm (66\")        Physical Exam  Constitutional:       Appearance: Normal appearance.   Genitourinary:      Bladder and urethral meatus normal.      No lesions in the vagina.      Right Labia: No rash, tenderness, lesions, skin changes or Bartholin's cyst.     Left Labia: No tenderness, lesions, skin changes, Bartholin's cyst or rash.     No labial fusion noted.      No inguinal adenopathy present in the right or left side.     Vaginal discharge (white, scant amount) present.      No vaginal erythema, tenderness, bleeding, ulceration or granulation tissue.      No vaginal prolapse present.     No vaginal atrophy present.       Right Adnexa: not tender, not full, not palpable, no mass present and not absent.     Left Adnexa: not tender, not full, not palpable, no mass present and not absent.     No cervical motion tenderness, discharge, friability, lesion, polyp, nabothian cyst or eversion.      Uterus is not enlarged, fixed, tender, irregular or prolapsed.      No uterine mass detected.  Abdominal:      General: There is no distension.      Palpations: Abdomen is soft. There is no mass.      Tenderness: There is no abdominal tenderness. There is no guarding.      Hernia: No hernia is present. There is no hernia in the left inguinal area or right inguinal area.   Lymphadenopathy:      Lower Body: No right inguinal adenopathy. No left inguinal adenopathy.   Neurological:      Mental Status: She is alert.   Vitals and nursing note reviewed.       Assessment/Plan    Diagnoses and all orders for this visit:    1. Vaginal discharge (Primary)  -     NuSwab VG+ - Swab, Vagina  -     metroNIDAZOLE (Flagyl) 500 MG tablet; Take 1 tablet by mouth 2 (Two) Times a Day for 7 days.  Dispense: 14 tablet; Refill: 0    2. Vaginal odor  -     NuSwab VG+ - Swab, Vagina  -     metroNIDAZOLE (Flagyl) 500 MG tablet; Take 1 tablet by " mouth 2 (Two) Times a Day for 7 days.  Dispense: 14 tablet; Refill: 0    Other orders  -     fluconazole (DIFLUCAN) 150 MG tablet; Take 1 tablet by mouth 1 (One) Time for 1 dose.  Dispense: 1 tablet; Refill: 2      Will treat empirically for BV at this time.   Advised avoidance of alcohol while taking  Reports frequent yeast infections with antibiotic use, requests diflucan be sent to pharmacy as well  Has upcoming AE appt next month    Follow up: PRN and annually    Sonya Carmona, STAR  11/10/2021  10:54 EST

## 2021-11-12 LAB
A VAGINAE DNA VAG QL NAA+PROBE: ABNORMAL SCORE
BVAB2 DNA VAG QL NAA+PROBE: ABNORMAL SCORE
C ALBICANS DNA VAG QL NAA+PROBE: NEGATIVE
C GLABRATA DNA VAG QL NAA+PROBE: NEGATIVE
C TRACH DNA VAG QL NAA+PROBE: NEGATIVE
MEGA1 DNA VAG QL NAA+PROBE: ABNORMAL SCORE
N GONORRHOEA DNA VAG QL NAA+PROBE: NEGATIVE
T VAGINALIS DNA VAG QL NAA+PROBE: NEGATIVE

## 2021-11-12 NOTE — PROGRESS NOTES
Please let the pt know that her vaginal cultures were positive for BV. She was treated for this in the office. Thank you

## 2022-06-01 ENCOUNTER — OFFICE VISIT (OUTPATIENT)
Dept: OBSTETRICS AND GYNECOLOGY | Facility: CLINIC | Age: 37
End: 2022-06-01

## 2022-06-01 VITALS
BODY MASS INDEX: 34.23 KG/M2 | SYSTOLIC BLOOD PRESSURE: 125 MMHG | HEART RATE: 72 BPM | WEIGHT: 213 LBS | HEIGHT: 66 IN | DIASTOLIC BLOOD PRESSURE: 82 MMHG

## 2022-06-01 DIAGNOSIS — Z01.419 ENCOUNTER FOR GYNECOLOGICAL EXAMINATION WITHOUT ABNORMAL FINDING: Primary | ICD-10-CM

## 2022-06-01 PROCEDURE — 99395 PREV VISIT EST AGE 18-39: CPT | Performed by: OBSTETRICS & GYNECOLOGY

## 2022-06-01 PROCEDURE — 2014F MENTAL STATUS ASSESS: CPT | Performed by: OBSTETRICS & GYNECOLOGY

## 2022-06-01 NOTE — PROGRESS NOTES
GYN Annual Exam     CC- Here for annual exam.     Diana Xie is a 37 y.o. female who presents for annual well woman exam. Periods are regular every 28-30 days, lasting 7 days. Dysmenorrhea:moderate, occurring throughout menses. Cyclic symptoms include none. No intermenstrual bleeding, spotting, or discharge.  Pt c/o periods lasting 7 days for the past year. Prior, periods were 4 days.     OB History        3    Para   3    Term   1            AB        Living   3       SAB        IAB        Ectopic        Molar        Multiple        Live Births                    Current contraception: tubal ligation  History of abnormal Pap smear: no  Family history of uterine, colon or ovarian cancer: yes - ovarian - grandmother   History of abnormal mammogram: no  Family history of breast cancer: no  Last Pap : 2020 NL HPV neg     Past Medical History:   Diagnosis Date   • Staph infection     AFTER C SECTION        Past Surgical History:   Procedure Laterality Date   • BREAST ABSCESS INCISION AND DRAINAGE Right 2018    Procedure: RT. BREAST ABSCESS INCISION AND DRAINAGE;  Surgeon: Dylan Luis MD;  Location: [facility];  Service:    •  SECTION     • TUBAL ABDOMINAL LIGATION     • WISDOM TOOTH EXTRACTION         No current outpatient medications on file.    No Known Allergies    Social History     Tobacco Use   • Smoking status: Former Smoker     Types: Cigarettes   • Smokeless tobacco: Never Used   Substance Use Topics   • Alcohol use: Yes   • Drug use: No       Family History   Problem Relation Age of Onset   • Ovarian cancer Maternal Grandmother 35   • Breast cancer Neg Hx    • Uterine cancer Neg Hx    • Colon cancer Neg Hx    • Deep vein thrombosis Neg Hx    • Pulmonary embolism Neg Hx        Review of Systems   Constitutional: Negative for chills and fever.   Gastrointestinal: Negative for abdominal pain, constipation and diarrhea.   Genitourinary: Negative for pelvic  "pain, vaginal bleeding and vaginal discharge.   All other systems reviewed and are negative.      /82   Pulse 72   Ht 167.6 cm (66\")   Wt 96.6 kg (213 lb)   LMP 05/26/2022 (Exact Date)   BMI 34.38 kg/m²     Physical Exam  Constitutional:       General: She is not in acute distress.     Appearance: She is well-developed and normal weight.   Genitourinary:      Vulva normal.      Right Labia: No lesions or Bartholin's cyst.     Left Labia: No lesions or Bartholin's cyst.     No inguinal adenopathy present in the right or left side.     No vaginal discharge or bleeding.        Right Adnexa: not tender, not full and no mass present.     Left Adnexa: not tender, not full and no mass present.     No cervical motion tenderness or friability.      Uterus is not enlarged or tender.      No uterine mass detected.     Uterus is anteverted.   Breasts:      Right: No inverted nipple, mass or nipple discharge.      Left: No inverted nipple, mass or nipple discharge.       HENT:      Head: Normocephalic and atraumatic.      Nose: Nose normal.   Eyes:      Conjunctiva/sclera: Conjunctivae normal.      Pupils: Pupils are equal, round, and reactive to light.   Neck:      Thyroid: No thyromegaly.   Cardiovascular:      Rate and Rhythm: Normal rate and regular rhythm.      Heart sounds: Normal heart sounds. No murmur heard.  Pulmonary:      Effort: Pulmonary effort is normal. No respiratory distress.      Breath sounds: Normal breath sounds.   Abdominal:      General: Abdomen is flat. There is no distension.      Palpations: Abdomen is soft.      Tenderness: There is no abdominal tenderness.   Musculoskeletal:         General: No deformity. Normal range of motion.      Cervical back: Normal range of motion and neck supple.      Right lower leg: No edema.      Left lower leg: No edema.   Lymphadenopathy:      Lower Body: No right inguinal adenopathy. No left inguinal adenopathy.   Neurological:      Mental Status: She is " alert and oriented to person, place, and time.   Skin:     General: Skin is warm and dry.      Findings: No erythema.   Psychiatric:         Behavior: Behavior normal.         Thought Content: Thought content normal.         Judgment: Judgment normal.   Vitals reviewed. Exam conducted with a chaperone present.               Assessment     Diagnoses and all orders for this visit:    1. Encounter for gynecological examination without abnormal finding (Primary)    GYN exam   Expectations reviewed.     Noted prolonged cycles  Discussed AUB - prior work up in 2019 essentially normal   So can monitor, not affecting ADLs  If worsens or ready to treat (discussed meds/Ablation) can return  Otherwise monitor      Plan     1) Breast Health - Clinical breast exam yearly, Discussed American cancer society recommendations for breast cancer screening, and Self breast awareness monthly  2) Pap - up to date   3) Smoking status- non-smoker   4) Encouraged to be wary of information obtained via social media and internet based on source and search.  5) Follow up prn and one year.       Hernandez Monteiro MD   6/1/2022  14:23 EDT

## 2022-09-19 ENCOUNTER — OFFICE VISIT (OUTPATIENT)
Dept: OBSTETRICS AND GYNECOLOGY | Facility: CLINIC | Age: 37
End: 2022-09-19

## 2022-09-19 VITALS
HEIGHT: 66 IN | WEIGHT: 214 LBS | SYSTOLIC BLOOD PRESSURE: 127 MMHG | DIASTOLIC BLOOD PRESSURE: 84 MMHG | BODY MASS INDEX: 34.39 KG/M2

## 2022-09-19 DIAGNOSIS — N94.9 VAGINAL BURNING: Primary | ICD-10-CM

## 2022-09-19 DIAGNOSIS — R30.0 DYSURIA: ICD-10-CM

## 2022-09-19 DIAGNOSIS — N89.8 VAGINAL ITCHING: ICD-10-CM

## 2022-09-19 DIAGNOSIS — N89.8 VAGINAL DISCHARGE: ICD-10-CM

## 2022-09-19 LAB
BILIRUB BLD-MCNC: NEGATIVE MG/DL
CLARITY, POC: CLEAR
COLOR UR: YELLOW
GLUCOSE UR STRIP-MCNC: NEGATIVE MG/DL
KETONES UR QL: NEGATIVE
LEUKOCYTE EST, POC: NEGATIVE
NITRITE UR-MCNC: NEGATIVE MG/ML
PH UR: 5.5 [PH] (ref 5–8)
PROT UR STRIP-MCNC: ABNORMAL MG/DL
RBC # UR STRIP: NEGATIVE /UL
SP GR UR: 1.03 (ref 1–1.03)
UROBILINOGEN UR QL: NORMAL

## 2022-09-19 PROCEDURE — 99214 OFFICE O/P EST MOD 30 MIN: CPT | Performed by: NURSE PRACTITIONER

## 2022-09-19 RX ORDER — SULFAMETHOXAZOLE AND TRIMETHOPRIM 800; 160 MG/1; MG/1
1 TABLET ORAL 2 TIMES DAILY
Qty: 10 TABLET | Refills: 0 | Status: SHIPPED | OUTPATIENT
Start: 2022-09-19 | End: 2022-09-24

## 2022-09-19 NOTE — PROGRESS NOTES
Chief Complaint   Patient presents with   • Vaginitis     Pt c/o vaginal itching and burning        SUBJECTIVE:     iDana Xie is a 37 y.o.  who presents with c/o vaginal itching x 1 week. She mentions that she unintentionally inserted 2 tampons, but does not feel tampons were in place for an extended amount of time these symptoms started soon after 2 tampons were removed. C/o dysuria and frequency X3-4 days. Burning occurs at the end of urine stream. C/o vaginal discharge with slight odor for several days. She has one new partner for the last 6 months. Using condoms with IC.  This is a new problem. LMP 22. Denies fevers, chills, vomiting. C/o slight pelvic pain and nausea. States she did not feel well all weekend and was in bed most of the time.  Denies ill contact, had negative covid 19 test twice this weekend    She is a patient of Dr. Paul.    Past Medical History:   Diagnosis Date   • Staph infection     AFTER C SECTION       Past Surgical History:   Procedure Laterality Date   • BREAST ABSCESS INCISION AND DRAINAGE Right 2018    Procedure: RT. BREAST ABSCESS INCISION AND DRAINAGE;  Surgeon: Dylan Luis MD;  Location: [facility];  Service:    •  SECTION     • TUBAL ABDOMINAL LIGATION     • WISDOM TOOTH EXTRACTION        Social History     Tobacco Use   • Smoking status: Former Smoker     Types: Cigarettes   • Smokeless tobacco: Never Used   Substance Use Topics   • Alcohol use: Yes   • Drug use: No     OB History    Para Term  AB Living   3 3 1     3   SAB IAB Ectopic Molar Multiple Live Births                    # Outcome Date GA Lbr David/2nd Weight Sex Delivery Anes PTL Lv   3 Term      CS-LTranv      2 Para      CS-LTranv      1 Para      CS-LTranv           Review of Systems   Constitutional: Negative for chills, fatigue and fever.   Gastrointestinal: Positive for abdominal distention and nausea. Negative for abdominal pain, constipation,  "diarrhea and vomiting.   Genitourinary: Positive for dysuria, frequency, pelvic pain and vaginal discharge. Negative for dyspareunia, menstrual problem, urgency, vaginal bleeding and vaginal pain.        + vaginal itching and burning   Musculoskeletal: Positive for back pain.       OBJECTIVE:   Vitals:    09/19/22 1437   BP: 127/84   Weight: 97.1 kg (214 lb)   Height: 167.6 cm (66\")        Physical Exam  Constitutional:       General: She is not in acute distress.     Appearance: Normal appearance. She is not ill-appearing, toxic-appearing or diaphoretic.   Genitourinary:      Bladder and urethral meatus normal.      No lesions in the vagina.      Right Labia: No rash, tenderness, lesions, skin changes or Bartholin's cyst.     Left Labia: No tenderness, lesions, skin changes, Bartholin's cyst or rash.     No labial fusion noted.      No inguinal adenopathy present in the right or left side.     Vaginal discharge (thick, yellow small amount) present.      No vaginal erythema, tenderness, bleeding, ulceration or granulation tissue.      No vaginal prolapse present.     No vaginal atrophy present.       Right Adnexa: not tender, not full, not palpable, no mass present and not absent.     Left Adnexa: not tender, not full, not palpable, no mass present and not absent.     No cervical motion tenderness, discharge, friability, lesion, polyp, nabothian cyst or eversion.      Uterus is not enlarged, fixed, tender, irregular or prolapsed.      No uterine mass detected.  Cardiovascular:      Rate and Rhythm: Normal rate.   Pulmonary:      Effort: Pulmonary effort is normal.   Abdominal:      General: There is no distension.      Palpations: Abdomen is soft. There is no mass.      Tenderness: There is no abdominal tenderness. There is left CVA tenderness. There is no right CVA tenderness, guarding or rebound.      Hernia: No hernia is present. There is no hernia in the left inguinal area or right inguinal area. "   Musculoskeletal:      Cervical back: Normal range of motion.   Lymphadenopathy:      Lower Body: No right inguinal adenopathy. No left inguinal adenopathy.   Neurological:      General: No focal deficit present.      Mental Status: She is alert and oriented to person, place, and time.      Cranial Nerves: No cranial nerve deficit.   Skin:     General: Skin is warm and dry.   Psychiatric:         Mood and Affect: Mood normal.         Behavior: Behavior normal.         Thought Content: Thought content normal.         Judgment: Judgment normal.   Vitals and nursing note reviewed.       Brief Urine Lab Results  (Last result in the past 365 days)      Color   Clarity   Blood   Leuk Est   Nitrite   Protein   CREAT   Urine HCG        09/19/22 1445 Yellow   Clear   Negative   Negative   Negative   30 mg/dL                 Assessment/Plan    Diagnoses and all orders for this visit:    1. Vaginal burning (Primary)  -     POC Urinalysis Dipstick  -     NuSwab VG+ - Swab, Vagina    2. Vaginal itching  -     NuSwab VG+ - Swab, Vagina    3. Vaginal discharge  -     NuSwab VG+ - Swab, Vagina    4. Dysuria  -     Urine Culture - Urine, Urine, Clean Catch  -     sulfamethoxazole-trimethoprim (Bactrim DS) 800-160 MG per tablet; Take 1 tablet by mouth 2 (Two) Times a Day for 5 days.  Dispense: 10 tablet; Refill: 0      Urine dip negative for blood, leukocytes, and nitrites. Positive proteinuria  Urine sent for culture. Concerned for positive CVA tenderness on Left side  Will treat empirically for UTI while awaiting results. Advised to go to ED with fevers, chills, vomiting, body aches, worsening pain  Encouraged increased hydration with water, avoid caffeine.   No pain with bimanual exam, yellow discharge noted, will await urine cultures and treat if indicated    Follow up: PRN and annually     I spent 31 minutes caring for Diana on this date of service. This time includes time spent by me in the following activities: preparing for  the visit, reviewing tests, obtaining and/or reviewing a separately obtained history, performing a medically appropriate examination and/or evaluation, counseling and educating the patient/family/caregiver, ordering medications, tests, or procedures, referring and communicating with other health care professionals and documenting information in the medical record      Sonya Carmona, APRN  9/19/2022  14:59 EDT

## 2022-09-21 LAB
A VAGINAE DNA VAG QL NAA+PROBE: NORMAL SCORE
BACTERIA UR CULT: NORMAL
BACTERIA UR CULT: NORMAL
BVAB2 DNA VAG QL NAA+PROBE: NORMAL SCORE
C ALBICANS DNA VAG QL NAA+PROBE: NEGATIVE
C GLABRATA DNA VAG QL NAA+PROBE: NEGATIVE
C TRACH DNA VAG QL NAA+PROBE: NEGATIVE
MEGA1 DNA VAG QL NAA+PROBE: NORMAL SCORE
N GONORRHOEA DNA VAG QL NAA+PROBE: NEGATIVE
T VAGINALIS DNA VAG QL NAA+PROBE: NEGATIVE

## 2023-08-28 ENCOUNTER — OFFICE VISIT (OUTPATIENT)
Dept: OBSTETRICS AND GYNECOLOGY | Facility: CLINIC | Age: 38
End: 2023-08-28
Payer: COMMERCIAL

## 2023-08-28 ENCOUNTER — TELEPHONE (OUTPATIENT)
Dept: OBSTETRICS AND GYNECOLOGY | Facility: CLINIC | Age: 38
End: 2023-08-28
Payer: COMMERCIAL

## 2023-08-28 VITALS
WEIGHT: 201 LBS | HEIGHT: 66 IN | BODY MASS INDEX: 32.3 KG/M2 | SYSTOLIC BLOOD PRESSURE: 120 MMHG | HEART RATE: 70 BPM | DIASTOLIC BLOOD PRESSURE: 77 MMHG

## 2023-08-28 DIAGNOSIS — Z01.419 ENCOUNTER FOR GYNECOLOGICAL EXAMINATION WITHOUT ABNORMAL FINDING: Primary | ICD-10-CM

## 2023-08-28 NOTE — PROGRESS NOTES
GYN Annual Exam     CC- Here for annual exam.     Diana Xie is a 38 y.o. female who presents for annual well woman exam. Periods are regular every 28-30 days, lasting 4 days. Dysmenorrhea:moderate, occurring throughout menses. Cyclic symptoms include none. No intermenstrual bleeding, spotting, or discharge.  Pt c/o increased cramping one week before her period the past 2 mths.     OB History          3    Para   3    Term   1            AB        Living   3         SAB        IAB        Ectopic        Molar        Multiple        Live Births                    Current contraception: tubal ligation  History of abnormal Pap smear: no  Family history of uterine, colon or ovarian cancer: yes - ovarian - grandmother   History of abnormal mammogram: no  Family history of breast cancer: no  Last Pap : 2020 NIL HPV neg     Past Medical History:   Diagnosis Date    Staph infection     AFTER C SECTION        Past Surgical History:   Procedure Laterality Date    BREAST ABSCESS INCISION AND DRAINAGE Right 2018    Procedure: RT. BREAST ABSCESS INCISION AND DRAINAGE;  Surgeon: Dylan Luis MD;  Location: [facility];  Service:      SECTION      TUBAL ABDOMINAL LIGATION      WISDOM TOOTH EXTRACTION         No current outpatient medications on file.    No Known Allergies    Social History     Tobacco Use    Smoking status: Former     Types: Cigarettes    Smokeless tobacco: Never   Substance Use Topics    Alcohol use: Yes    Drug use: No       Family History   Problem Relation Age of Onset    Ovarian cancer Maternal Grandmother 35    Breast cancer Neg Hx     Uterine cancer Neg Hx     Colon cancer Neg Hx     Deep vein thrombosis Neg Hx     Pulmonary embolism Neg Hx        Review of Systems   Constitutional:  Negative for chills and fever.   Gastrointestinal:  Negative for abdominal pain, constipation and diarrhea.   Genitourinary:  Positive for pelvic pain. Negative for menstrual  "problem, vaginal bleeding and vaginal discharge.   All other systems reviewed and are negative.    /77   Pulse 70   Ht 167.6 cm (66\")   Wt 91.2 kg (201 lb)   LMP 08/21/2023 (Exact Date)   BMI 32.44 kg/mý     Physical Exam  Constitutional:       General: She is not in acute distress.     Appearance: She is well-developed. She is obese.   Genitourinary:      Vulva normal.      Right Labia: No lesions or Bartholin's cyst.     Left Labia: No lesions or Bartholin's cyst.     No inguinal adenopathy present in the right or left side.     No vaginal discharge or bleeding.        Right Adnexa: not tender, not full and no mass present.     Left Adnexa: not tender, not full and no mass present.     No cervical motion tenderness or friability.      Uterus is not enlarged or tender.      No uterine mass detected.     Uterus is anteverted.   Breasts:     Right: Skin change (near inner areola - had biopsy last year, slow to heal from irritation) present. No inverted nipple, mass or nipple discharge.      Left: No inverted nipple, mass or nipple discharge.   HENT:      Head: Normocephalic and atraumatic.      Nose: Nose normal.   Eyes:      Conjunctiva/sclera: Conjunctivae normal.      Pupils: Pupils are equal, round, and reactive to light.   Neck:      Thyroid: No thyromegaly.   Cardiovascular:      Rate and Rhythm: Normal rate and regular rhythm.      Heart sounds: Normal heart sounds. No murmur heard.  Pulmonary:      Effort: Pulmonary effort is normal. No respiratory distress.      Breath sounds: Normal breath sounds.   Abdominal:      General: Abdomen is flat. There is no distension.      Palpations: Abdomen is soft.      Tenderness: There is no abdominal tenderness.   Musculoskeletal:         General: No deformity. Normal range of motion.      Cervical back: Normal range of motion and neck supple.      Right lower leg: No edema.      Left lower leg: No edema.   Lymphadenopathy:      Lower Body: No right inguinal " and no right inguinal adenopathy. No left inguinal and no left inguinal adenopathy.   Neurological:      Mental Status: She is alert and oriented to person, place, and time.   Skin:     General: Skin is warm and dry.      Findings: No erythema.   Psychiatric:         Behavior: Behavior normal.         Thought Content: Thought content normal.         Judgment: Judgment normal.   Vitals reviewed. Exam conducted with a chaperone present.             Assessment     Diagnoses and all orders for this visit:    1. GYN annual  (Primary)  -     IGP, Apt HPV,rfx 16 / 18,45    Expectations reviewed.   Discussed ovulation, pre cycle pain   NSAID vs hormonal suppression   Return when ready to consider treatment.      Plan     1) Breast Health - Clinical breast exam yearly, Discussed American cancer society recommendations for breast cancer screening, and Self breast awareness monthly  CBE normal, updated today   2) Pap - updated today   3) Smoking status- non-smoker   4) Encouraged between 7-8 hours of good sleep per night.   5) Follow up prn and one year.       Hernandez Monteiro MD   8/28/2023  13:38 EDT

## 2023-08-28 NOTE — TELEPHONE ENCOUNTER
PROVIDER: DR. TERRELL    Caller: Diana Xie    Relationship: Self    Best call back number: [phone]    What form or medical record are you requesting: DOCTOR'S NOTE FOR TODAY    Who is requesting this form or medical record from you: WORK    How would you like to receive the form or medical records (pick-up, mail, fax): EMAIL  EMAIL TO: [email or web address]  If fax, what is the fax number:   If mail, what is the address:   If pick-up, provide patient with address and location details      Timeframe paperwork needed: SHE IS ON WAY BACK TO WORK NOW    Additional notes:

## 2023-08-31 LAB
CYTOLOGIST CVX/VAG CYTO: NORMAL
CYTOLOGY CVX/VAG DOC CYTO: NORMAL
CYTOLOGY CVX/VAG DOC THIN PREP: NORMAL
DX ICD CODE: NORMAL
HIV 1 & 2 AB SER-IMP: NORMAL
HPV I/H RISK 4 DNA CVX QL PROBE+SIG AMP: NEGATIVE
OTHER STN SPEC: NORMAL
STAT OF ADQ CVX/VAG CYTO-IMP: NORMAL

## 2023-11-08 ENCOUNTER — OFFICE VISIT (OUTPATIENT)
Dept: OBSTETRICS AND GYNECOLOGY | Facility: CLINIC | Age: 38
End: 2023-11-08
Payer: COMMERCIAL

## 2023-11-08 VITALS
HEIGHT: 66 IN | WEIGHT: 195.8 LBS | SYSTOLIC BLOOD PRESSURE: 128 MMHG | BODY MASS INDEX: 31.47 KG/M2 | DIASTOLIC BLOOD PRESSURE: 78 MMHG

## 2023-11-08 DIAGNOSIS — N89.8 VAGINAL ODOR: ICD-10-CM

## 2023-11-08 DIAGNOSIS — N89.8 VAGINAL DISCHARGE: Primary | ICD-10-CM

## 2023-11-08 NOTE — PROGRESS NOTES
"Chief Complaint   Patient presents with    Follow-up     Patient is here today c/o excessive clear discharge and vaginal odor x 2 months. Denies pelvic pain.         SUBJECTIVE:     Diana Xie is a 38 y.o.  who presents with c/o clear vaginal discharge and odor for 2-3 months. This is intermittent. She denies fish like odor. Denies vaginal itching or odor. Denies a change in soaps, hygiene products. She is not using douches. Denies new partners. Denies pelvic pain or dysuria. This is a new problem. LMP 10/11/2023. She is a patient of Dr. Paul.    Past Medical History:   Diagnosis Date    Staph infection     AFTER C SECTION       Past Surgical History:   Procedure Laterality Date    BREAST ABSCESS INCISION AND DRAINAGE Right 2018    Procedure: RT. BREAST ABSCESS INCISION AND DRAINAGE;  Surgeon: Dylan Luis MD;  Location: [facility];  Service:      SECTION      TUBAL ABDOMINAL LIGATION      WISDOM TOOTH EXTRACTION          Review of Systems   Constitutional:  Negative for chills, fatigue and fever.   Gastrointestinal:  Negative for abdominal distention and abdominal pain.   Genitourinary:  Positive for vaginal discharge. Negative for dyspareunia, dysuria, menstrual problem, pelvic pain, vaginal bleeding and vaginal pain.        + vaginal odor       OBJECTIVE:   Vitals:    23 1433   BP: 128/78   Weight: 88.8 kg (195 lb 12.8 oz)   Height: 167.6 cm (65.98\")        Physical Exam  Constitutional:       General: She is not in acute distress.     Appearance: Normal appearance. She is not ill-appearing, toxic-appearing or diaphoretic.   Genitourinary:      Bladder and urethral meatus normal.      No lesions in the vagina.      Right Labia: No rash, tenderness, lesions, skin changes or Bartholin's cyst.     Left Labia: No tenderness, lesions, skin changes, Bartholin's cyst or rash.     No labial fusion noted.      No inguinal adenopathy present in the right or left side.     " No vaginal discharge, erythema, tenderness, bleeding, ulceration or granulation tissue.      No vaginal prolapse present.     No vaginal atrophy present.       Right Adnexa: not tender, not full, not palpable, no mass present and not absent.     Left Adnexa: not tender, not full, not palpable, no mass present and not absent.     No cervical motion tenderness, discharge, friability, lesion, polyp, nabothian cyst or eversion.      Uterus is not enlarged, fixed, tender, irregular or prolapsed.      No uterine mass detected.  Abdominal:      General: There is no distension.      Palpations: Abdomen is soft. There is no mass.      Tenderness: There is no abdominal tenderness. There is no guarding.      Hernia: No hernia is present. There is no hernia in the left inguinal area or right inguinal area.   Lymphadenopathy:      Lower Body: No right inguinal adenopathy. No left inguinal adenopathy.   Neurological:      Mental Status: She is alert.   Vitals and nursing note reviewed.       Assessment/Plan    Diagnoses and all orders for this visit:    1. Vaginal discharge (Primary)  -     NuSwab VG+ - Swab, Vagina    2. Vaginal odor  -     NuSwab VG+ - Swab, Vagina      No abnormal discharge or odor noted   Vaginal cultures obtained  Discussed use of probiotics  Encouraged condoms with IC, cotton only underwear, mild or no soaps, avoidance of douching or rebecca steaming    Return if symptoms worsen or fail to improve.    I spent 20 minutes caring for Diana on this date of service. This time includes time spent by me in the following activities: preparing for the visit, reviewing tests, obtaining and/or reviewing a separately obtained history, performing a medically appropriate examination and/or evaluation, counseling and educating the patient/family/caregiver, ordering medications, tests, or procedures, referring and communicating with other health care professionals, and documenting information in the medical record    Sonya UREÑA  STAR Carmona  11/8/2023  14:55 EST

## 2023-11-10 RX ORDER — METRONIDAZOLE 500 MG/1
500 TABLET ORAL 2 TIMES DAILY
Qty: 14 TABLET | Refills: 0 | Status: SHIPPED | OUTPATIENT
Start: 2023-11-10 | End: 2023-11-17

## 2023-11-10 NOTE — PROGRESS NOTES
Please let the patient know that her vaginal culture results were positive for bacterial vaginosis.  I have sent a prescription for flagyl to her pharmacy to treat. She should not drink alcohol while taking this medication. Thanks

## 2023-11-14 ENCOUNTER — TELEPHONE (OUTPATIENT)
Dept: OBSTETRICS AND GYNECOLOGY | Facility: CLINIC | Age: 38
End: 2023-11-14
Payer: COMMERCIAL

## 2023-11-14 RX ORDER — FLUCONAZOLE 150 MG/1
150 TABLET ORAL
Qty: 2 TABLET | Refills: 0 | Status: SHIPPED | OUTPATIENT
Start: 2023-11-14

## 2023-11-14 NOTE — TELEPHONE ENCOUNTER
Left message for Diana that STAR Jones sent in diflucan to your [facility] pharmacy. We hope you feel better.

## 2023-11-14 NOTE — TELEPHONE ENCOUNTER
Caller: Diana Xie    Relationship: Self    Best call back number: [phone] CALL ANYTIME, IT IS OKAY TO LVM    Requested Prescriptions:   Requested Prescriptions      No prescriptions requested or ordered in this encounter      DIFLUCAN    Pharmacy where request should be sent: [id number] [place], [place] - [address] AT [facility] [address] [facility] [phone] [facility] [phone] FX     Last office visit with prescribing clinician: 8/28/2023   Last telemedicine visit with prescribing clinician: Visit date not found   Next office visit with prescribing clinician: Visit date not found     Additional details provided by patient: FLAGYL WAS SENT IN ON 11/10/23. PATIENT STATES FLAGYL USUALLY CAUSES A YEAST INFECTION AND REQUESTING DIFLUCAN TO SENT AS SOON AS POSSIBLE.    Does the patient have less than a 3 day supply:  [] Yes  [] No    Would you like a call back once the refill request has been completed: [] Yes [x] No    If the office needs to give you a call back, can they leave a voicemail: [x] Yes [] No    Manfred Lopez Rep   11/14/23 14:28 EST

## 2024-01-04 NOTE — ANESTHESIA PREPROCEDURE EVALUATION
Anesthesia Evaluation     Patient summary reviewed          Airway   Mallampati: I  TM distance: >3 FB  Neck ROM: full  no difficulty expected  Dental - normal exam     Pulmonary     breath sounds clear to auscultation  Cardiovascular   Exercise tolerance: good (4-7 METS)    Rhythm: regular  Rate: normal        Neuro/Psych  GI/Hepatic/Renal/Endo      Musculoskeletal     Abdominal    Substance History      OB/GYN          Other                                                Anesthesia Plan    ASA 1     general     intravenous induction   Anesthetic plan and risks discussed with patient.      
None

## 2024-12-17 ENCOUNTER — TELEPHONE (OUTPATIENT)
Dept: OBSTETRICS AND GYNECOLOGY | Facility: CLINIC | Age: 39
End: 2024-12-17

## 2024-12-17 NOTE — TELEPHONE ENCOUNTER
Caller: Diana Xie    Relationship: Self    Best call back number: [phone]      Who are you requesting to speak with (clinical staff, DR. TERRELL    What was the call regarding: PATIENT CALLED IN ABOUT HAVING SOME DISCHARGE, NO ODER, NO OTHER SYMPTOMS.  WOULD LIKE TO BE SEEN,  HUB HAD NO AVAILABILITY WITH NO ONE UNTIL JAN, PLEASE ASSIST.

## 2025-01-10 ENCOUNTER — TELEPHONE (OUTPATIENT)
Dept: OBSTETRICS AND GYNECOLOGY | Facility: CLINIC | Age: 40
End: 2025-01-10

## 2025-01-10 NOTE — TELEPHONE ENCOUNTER
Caller: Diana Xie    Relationship:  Self    Best call back number: [phone] (home) [phone] (work)      PATIENT CALLED REQUESTING TO CANCEL SAME DAY APPT.    Did the patient call AFTER the start time of their scheduled appointment?  []YES  [x]NO    Was the patient's appointment rescheduled? [x]YES  []NO    Any additional information: PATIENTS PASSPORT MEDICAID IS INACTIVE. SHE HAS RESCHEDULED FOR FEB.

## (undated) DEVICE — LOU MINOR PROCEDURE: Brand: MEDLINE INDUSTRIES, INC.

## (undated) DEVICE — IRRIGATOR BULB ASEPTO 60CC STRL

## (undated) DEVICE — APPL CHLORAPREP W/TINT 10.5ML PERC STRL

## (undated) DEVICE — SPNG GZ WOVN 4X4IN 12PLY 10/BX STRL

## (undated) DEVICE — DRN PENRS 1/4X18IN LTX

## (undated) DEVICE — ENCORE® LATEX ORTHO SIZE 8, STERILE LATEX POWDER-FREE SURGICAL GLOVE: Brand: ENCORE